# Patient Record
Sex: FEMALE | Race: WHITE | NOT HISPANIC OR LATINO | Employment: UNEMPLOYED | URBAN - METROPOLITAN AREA
[De-identification: names, ages, dates, MRNs, and addresses within clinical notes are randomized per-mention and may not be internally consistent; named-entity substitution may affect disease eponyms.]

---

## 2019-02-08 ENCOUNTER — HOSPITAL ENCOUNTER (EMERGENCY)
Facility: HOSPITAL | Age: 24
Discharge: HOME | End: 2019-02-08
Attending: EMERGENCY MEDICINE | Admitting: EMERGENCY MEDICINE

## 2019-02-08 ENCOUNTER — APPOINTMENT (OUTPATIENT)
Dept: ULTRASOUND IMAGING | Facility: HOSPITAL | Age: 24
End: 2019-02-08

## 2019-02-08 ENCOUNTER — HOSPITAL ENCOUNTER (OUTPATIENT)
Facility: HOSPITAL | Age: 24
Setting detail: OBSERVATION
Discharge: ANOTHER HOSPITAL | End: 2019-02-08
Attending: OBSTETRICS & GYNECOLOGY | Admitting: OBSTETRICS & GYNECOLOGY

## 2019-02-08 VITALS
WEIGHT: 190 LBS | TEMPERATURE: 98.4 F | BODY MASS INDEX: 33.66 KG/M2 | RESPIRATION RATE: 18 BRPM | OXYGEN SATURATION: 98 % | SYSTOLIC BLOOD PRESSURE: 136 MMHG | HEIGHT: 63 IN | DIASTOLIC BLOOD PRESSURE: 71 MMHG | HEART RATE: 79 BPM

## 2019-02-08 VITALS
TEMPERATURE: 98 F | SYSTOLIC BLOOD PRESSURE: 132 MMHG | HEIGHT: 63 IN | HEART RATE: 80 BPM | BODY MASS INDEX: 33.66 KG/M2 | WEIGHT: 190 LBS | RESPIRATION RATE: 17 BRPM | DIASTOLIC BLOOD PRESSURE: 82 MMHG

## 2019-02-08 DIAGNOSIS — O46.90 VAGINAL BLEEDING DURING PREGNANCY: Primary | ICD-10-CM

## 2019-02-08 PROBLEM — O20.0 THREATENED ABORTION: Status: ACTIVE | Noted: 2019-02-08

## 2019-02-08 PROBLEM — Z34.90 PREGNANCY: Status: ACTIVE | Noted: 2019-02-08

## 2019-02-08 PROBLEM — O20.9 VAGINAL BLEEDING BEFORE 22 WEEKS GESTATION: Status: ACTIVE | Noted: 2019-02-08

## 2019-02-08 PROBLEM — F17.200 SMOKER: Status: ACTIVE | Noted: 2019-02-08

## 2019-02-08 PROBLEM — F11.90 NARCOTIC DRUG USE: Status: ACTIVE | Noted: 2019-02-08

## 2019-02-08 LAB
ABO GROUP BLD: NORMAL
AMPHET+METHAMPHET UR QL: NEGATIVE
ANION GAP SERPL CALCULATED.3IONS-SCNC: 10.5 MMOL/L
BACTERIA UR QL AUTO: ABNORMAL /HPF
BARBITURATES UR QL SCN: NEGATIVE
BASOPHILS # BLD AUTO: 0.03 10*3/MM3 (ref 0–0.2)
BASOPHILS NFR BLD AUTO: 0.2 % (ref 0–1.5)
BENZODIAZ UR QL SCN: NEGATIVE
BILIRUB UR QL STRIP: NEGATIVE
BLD GP AB SCN SERPL QL: NEGATIVE
BUN BLD-MCNC: 6 MG/DL (ref 6–20)
BUN/CREAT SERPL: 11.1 (ref 7–25)
CALCIUM SPEC-SCNC: 9.2 MG/DL (ref 8.6–10.5)
CANNABINOIDS SERPL QL: NEGATIVE
CHLORIDE SERPL-SCNC: 108 MMOL/L (ref 98–107)
CLARITY UR: CLEAR
CLUE CELLS SPEC QL WET PREP: ABNORMAL
CO2 SERPL-SCNC: 23.5 MMOL/L (ref 22–29)
COCAINE UR QL: NEGATIVE
COLOR UR: YELLOW
CREAT BLD-MCNC: 0.54 MG/DL (ref 0.57–1)
DEPRECATED RDW RBC AUTO: 46.4 FL (ref 37–54)
EOSINOPHIL # BLD AUTO: 0.31 10*3/MM3 (ref 0–0.7)
EOSINOPHIL NFR BLD AUTO: 2.5 % (ref 0.3–6.2)
ERYTHROCYTE [DISTWIDTH] IN BLOOD BY AUTOMATED COUNT: 13.1 % (ref 11.7–13)
GFR SERPL CREATININE-BSD FRML MDRD: 141 ML/MIN/1.73
GLUCOSE BLD-MCNC: 80 MG/DL (ref 65–99)
GLUCOSE UR STRIP-MCNC: NEGATIVE MG/DL
HCG INTACT+B SERPL-ACNC: 3771 MIU/ML
HCT VFR BLD AUTO: 37.9 % (ref 35.6–45.5)
HGB BLD-MCNC: 12.7 G/DL (ref 11.9–15.5)
HGB UR QL STRIP.AUTO: ABNORMAL
HOLD SPECIMEN: NORMAL
HOLD SPECIMEN: NORMAL
HYALINE CASTS UR QL AUTO: ABNORMAL /LPF
HYDATID CYST SPEC WET PREP: ABNORMAL
IMM GRANULOCYTES # BLD AUTO: 0.05 10*3/MM3 (ref 0–0.03)
IMM GRANULOCYTES NFR BLD AUTO: 0.4 % (ref 0–0.5)
KETONES UR QL STRIP: ABNORMAL
LEUKOCYTE ESTERASE UR QL STRIP.AUTO: NEGATIVE
LYMPHOCYTES # BLD AUTO: 2.41 10*3/MM3 (ref 0.9–4.8)
LYMPHOCYTES NFR BLD AUTO: 19.5 % (ref 19.6–45.3)
MCH RBC QN AUTO: 32.7 PG (ref 26.9–32)
MCHC RBC AUTO-ENTMCNC: 33.5 G/DL (ref 32.4–36.3)
MCV RBC AUTO: 97.7 FL (ref 80.5–98.2)
METHADONE UR QL SCN: NEGATIVE
MONOCYTES # BLD AUTO: 1.12 10*3/MM3 (ref 0.2–1.2)
MONOCYTES NFR BLD AUTO: 9.1 % (ref 5–12)
NEUTROPHILS # BLD AUTO: 8.44 10*3/MM3 (ref 1.9–8.1)
NEUTROPHILS NFR BLD AUTO: 68.3 % (ref 42.7–76)
NITRITE UR QL STRIP: NEGATIVE
NRBC BLD AUTO-RTO: 0 /100 WBC (ref 0–0)
OPIATES UR QL: POSITIVE
OXYCODONE UR QL SCN: NEGATIVE
PH UR STRIP.AUTO: 7 [PH] (ref 5–8)
PLATELET # BLD AUTO: 229 10*3/MM3 (ref 140–500)
PMV BLD AUTO: 9.5 FL (ref 6–12)
POTASSIUM BLD-SCNC: 3.8 MMOL/L (ref 3.5–5.2)
PROT UR QL STRIP: NEGATIVE
RBC # BLD AUTO: 3.88 10*6/MM3 (ref 3.9–5.2)
RBC # UR: ABNORMAL /HPF
REF LAB TEST METHOD: ABNORMAL
RH BLD: POSITIVE
SODIUM BLD-SCNC: 142 MMOL/L (ref 136–145)
SP GR UR STRIP: 1.01 (ref 1–1.03)
SQUAMOUS #/AREA URNS HPF: ABNORMAL /HPF
T VAGINALIS SPEC QL WET PREP: ABNORMAL
T&S EXPIRATION DATE: NORMAL
UROBILINOGEN UR QL STRIP: ABNORMAL
WBC NRBC COR # BLD: 12.36 10*3/MM3 (ref 4.5–10.7)
WBC SPEC QL WET PREP: ABNORMAL
WBC UR QL AUTO: ABNORMAL /HPF
WHOLE BLOOD HOLD SPECIMEN: NORMAL
WHOLE BLOOD HOLD SPECIMEN: NORMAL
YEAST GENITAL QL WET PREP: ABNORMAL

## 2019-02-08 PROCEDURE — 25010000002 MORPHINE PER 10 MG: Performed by: EMERGENCY MEDICINE

## 2019-02-08 PROCEDURE — 80048 BASIC METABOLIC PNL TOTAL CA: CPT | Performed by: EMERGENCY MEDICINE

## 2019-02-08 PROCEDURE — 87491 CHLMYD TRACH DNA AMP PROBE: CPT | Performed by: OBSTETRICS & GYNECOLOGY

## 2019-02-08 PROCEDURE — 84702 CHORIONIC GONADOTROPIN TEST: CPT | Performed by: EMERGENCY MEDICINE

## 2019-02-08 PROCEDURE — 80307 DRUG TEST PRSMV CHEM ANLYZR: CPT | Performed by: OBSTETRICS & GYNECOLOGY

## 2019-02-08 PROCEDURE — 96361 HYDRATE IV INFUSION ADD-ON: CPT

## 2019-02-08 PROCEDURE — 76817 TRANSVAGINAL US OBSTETRIC: CPT

## 2019-02-08 PROCEDURE — 76805 OB US >/= 14 WKS SNGL FETUS: CPT

## 2019-02-08 PROCEDURE — 87109 MYCOPLASMA: CPT | Performed by: OBSTETRICS & GYNECOLOGY

## 2019-02-08 PROCEDURE — 87210 SMEAR WET MOUNT SALINE/INK: CPT | Performed by: OBSTETRICS & GYNECOLOGY

## 2019-02-08 PROCEDURE — 81001 URINALYSIS AUTO W/SCOPE: CPT | Performed by: OBSTETRICS & GYNECOLOGY

## 2019-02-08 PROCEDURE — 99283 EMERGENCY DEPT VISIT LOW MDM: CPT

## 2019-02-08 PROCEDURE — 85025 COMPLETE CBC W/AUTO DIFF WBC: CPT | Performed by: EMERGENCY MEDICINE

## 2019-02-08 PROCEDURE — 86900 BLOOD TYPING SEROLOGIC ABO: CPT | Performed by: EMERGENCY MEDICINE

## 2019-02-08 PROCEDURE — 96374 THER/PROPH/DIAG INJ IV PUSH: CPT

## 2019-02-08 PROCEDURE — 25010000002 HYDROMORPHONE 1 MG/ML SOLUTION: Performed by: OBSTETRICS & GYNECOLOGY

## 2019-02-08 PROCEDURE — G0378 HOSPITAL OBSERVATION PER HR: HCPCS

## 2019-02-08 PROCEDURE — 99214 OFFICE O/P EST MOD 30 MIN: CPT | Performed by: OBSTETRICS & GYNECOLOGY

## 2019-02-08 PROCEDURE — 86901 BLOOD TYPING SEROLOGIC RH(D): CPT | Performed by: EMERGENCY MEDICINE

## 2019-02-08 PROCEDURE — 86850 RBC ANTIBODY SCREEN: CPT | Performed by: EMERGENCY MEDICINE

## 2019-02-08 PROCEDURE — 87591 N.GONORRHOEAE DNA AMP PROB: CPT | Performed by: OBSTETRICS & GYNECOLOGY

## 2019-02-08 RX ORDER — GABAPENTIN 300 MG/1
300 CAPSULE ORAL NIGHTLY
COMMUNITY
End: 2020-05-15

## 2019-02-08 RX ORDER — MORPHINE SULFATE 2 MG/ML
4 INJECTION, SOLUTION INTRAMUSCULAR; INTRAVENOUS ONCE
Status: COMPLETED | OUTPATIENT
Start: 2019-02-08 | End: 2019-02-08

## 2019-02-08 RX ORDER — SODIUM CHLORIDE, SODIUM LACTATE, POTASSIUM CHLORIDE, CALCIUM CHLORIDE 600; 310; 30; 20 MG/100ML; MG/100ML; MG/100ML; MG/100ML
125 INJECTION, SOLUTION INTRAVENOUS CONTINUOUS
Status: DISCONTINUED | OUTPATIENT
Start: 2019-02-08 | End: 2019-02-09 | Stop reason: HOSPADM

## 2019-02-08 RX ORDER — SODIUM CHLORIDE 0.9 % (FLUSH) 0.9 %
10 SYRINGE (ML) INJECTION AS NEEDED
Status: DISCONTINUED | OUTPATIENT
Start: 2019-02-08 | End: 2019-02-08 | Stop reason: HOSPADM

## 2019-02-08 RX ORDER — HYDROCODONE BITARTRATE AND ACETAMINOPHEN 5; 325 MG/1; MG/1
1 TABLET ORAL EVERY 6 HOURS PRN
COMMUNITY
End: 2020-05-15

## 2019-02-08 RX ADMIN — HYDROMORPHONE HYDROCHLORIDE 1 MG: 1 INJECTION, SOLUTION INTRAMUSCULAR; INTRAVENOUS; SUBCUTANEOUS at 20:25

## 2019-02-08 RX ADMIN — MORPHINE SULFATE 4 MG: 2 INJECTION, SOLUTION INTRAMUSCULAR; INTRAVENOUS at 16:11

## 2019-02-08 RX ADMIN — SODIUM CHLORIDE, POTASSIUM CHLORIDE, SODIUM LACTATE AND CALCIUM CHLORIDE 125 ML/HR: 600; 310; 30; 20 INJECTION, SOLUTION INTRAVENOUS at 20:25

## 2019-02-08 NOTE — ED PROVIDER NOTES
" EMERGENCY DEPARTMENT ENCOUNTER    Room Number:  OFE/OFE  Date seen:  2019  Time seen: 2:58 PM  PCP: Lisa Negro,    OB: Dr. Mark Hamilton  Historian: patient    HPI:  Chief Complaint: vaginal bleeding  A complete HPI/ROS/PMH/PSH/SH/FH are unobtainable due to: nothing  Context: Mignon Vasques is a 23 y.o. female  () who presents to the ED c/o vaginal bleeding (\"bright red\" blood) that started one hour ago. The patient denies passing any blood clots. The patient also complains of suprapubic and LLQ \"cramping\" which worsens with palpation. The patient reports that her LNMP was in 10/2018. The patient reports that she had a positive home pregnancy test last week. Patient denies currently taking prenatal vitamins. Patient also denies seeing her OB (Dr. Mark Hamilton) for this pregnancy. The patient reports that she has a nine month old daughter and is not currently breast feeding. Patient has a hx of chronic back pain for which she takes Hydrocodone. Patient denies hx of anemia. Patient reports that she has had uncomplicated vaginal deliveries with her three prior pregnancies. Patient admits to smoking but denies ETOH consumption. There are no other complaints at this time.    Location: vagina  Radiation: none specified  Quality: vaginal bleeding (\"bright red\" blood)  Intensity/Severity: moderate  Duration: one hour  Onset quality: gradual  Timing: constant  Progression: none specified  Aggravating Factors: none specified  Alleviating Factors: none specified  Previous Episodes: none specified  Treatment before arrival: none specified  Associated Symptoms: suprapubic and LLQ \"cramping\" which worsens with palpation    PAST MEDICAL HISTORY  Active Ambulatory Problems     Diagnosis Date Noted   • Sore throat 2016   • Threatened  2019   • Back pain    • Narcotic drug use 2019   • Smoker 2019   • Pregnancy 2019   • Vaginal bleeding before 22 weeks gestation 2019 " "    Resolved Ambulatory Problems     Diagnosis Date Noted   • No Resolved Ambulatory Problems     Past Medical History:   Diagnosis Date   • Asthma    • Back pain    • Back pain    • Migraine          PAST SURGICAL HISTORY  Past Surgical History:   Procedure Laterality Date   • CHOLECYSTECTOMY     • DENTAL PROCEDURE           FAMILY HISTORY  Family History   Problem Relation Age of Onset   • Asthma Mother    • Diabetes Mother    • Hypertension Mother          SOCIAL HISTORY  Social History     Socioeconomic History   • Marital status:      Spouse name: Not on file   • Number of children: Not on file   • Years of education: Not on file   • Highest education level: Not on file   Social Needs   • Financial resource strain: Not on file   • Food insecurity - worry: Not on file   • Food insecurity - inability: Not on file   • Transportation needs - medical: Not on file   • Transportation needs - non-medical: Not on file   Occupational History   • Occupation: IT    Tobacco Use   • Smoking status: Current Every Day Smoker     Packs/day: 0.50     Types: Cigarettes   • Smokeless tobacco: Never Used   Substance and Sexual Activity   • Alcohol use: No   • Drug use: Yes     Types: Marijuana     Comment: vicodan for back pain    • Sexual activity: Yes     Partners: Male   Other Topics Concern   • Not on file   Social History Narrative   • Not on file         ALLERGIES  Amoxicillin and Adhesive tape        REVIEW OF SYSTEMS  Review of Systems   Constitutional: Negative for diaphoresis and fever.   HENT: Negative for congestion.    Eyes: Negative for visual disturbance.   Respiratory: Negative for shortness of breath.    Cardiovascular: Negative for palpitations.   Gastrointestinal: Positive for abdominal pain (suprapubic and LLQ \"cramping\" which worsens with palpation). Negative for blood in stool and vomiting.   Endocrine: Negative for polyuria.   Genitourinary: Positive for vaginal bleeding (\"bright red\"). Negative for " flank pain.   Musculoskeletal: Negative for joint swelling.   Skin: Negative for wound.   Neurological: Negative for seizures.   Hematological: Negative for adenopathy.   Psychiatric/Behavioral: Negative for sleep disturbance.            PHYSICAL EXAM  ED Triage Vitals [02/08/19 1430]   Temp Heart Rate Resp BP SpO2   98.4 °F (36.9 °C) 113 18 146/79 95 %      Temp src Heart Rate Source Patient Position BP Location FiO2 (%)   Tympanic Monitor -- -- --         GENERAL: no acute distress  HENT: nares patent  EYES: no scleral icterus  CV: regular rhythm, normal rate  RESPIRATORY: normal effort, CTAB  ABDOMEN: soft, mild suprapubic and LLQ tenderness without rebound or guarding  : performed pelvic exam with female chaperone, there is bright red blood on exam, cervical os is closed  MUSCULOSKELETAL: no deformity  NEURO: alert, moves all extremities, follows commands  SKIN: warm, dry    Vital signs and nursing notes reviewed.          LAB RESULTS  Recent Results (from the past 24 hour(s))   Light Blue Top    Collection Time: 02/08/19  2:52 PM   Result Value Ref Range    Extra Tube hold for add-on    Green Top (Gel)    Collection Time: 02/08/19  2:52 PM   Result Value Ref Range    Extra Tube Hold for add-ons.    Lavender Top    Collection Time: 02/08/19  2:52 PM   Result Value Ref Range    Extra Tube hold for add-on    Gold Top - SST    Collection Time: 02/08/19  2:52 PM   Result Value Ref Range    Extra Tube Hold for add-ons.    Basic Metabolic Panel    Collection Time: 02/08/19  2:52 PM   Result Value Ref Range    Glucose 80 65 - 99 mg/dL    BUN 6 6 - 20 mg/dL    Creatinine 0.54 (L) 0.57 - 1.00 mg/dL    Sodium 142 136 - 145 mmol/L    Potassium 3.8 3.5 - 5.2 mmol/L    Chloride 108 (H) 98 - 107 mmol/L    CO2 23.5 22.0 - 29.0 mmol/L    Calcium 9.2 8.6 - 10.5 mg/dL    eGFR Non African Amer 141 >60 mL/min/1.73    BUN/Creatinine Ratio 11.1 7.0 - 25.0    Anion Gap 10.5 mmol/L   hCG, Quantitative, Pregnancy    Collection Time:  02/08/19  2:52 PM   Result Value Ref Range    HCG Quantitative 3,771.00 mIU/mL   CBC Auto Differential    Collection Time: 02/08/19  2:52 PM   Result Value Ref Range    WBC 12.36 (H) 4.50 - 10.70 10*3/mm3    RBC 3.88 (L) 3.90 - 5.20 10*6/mm3    Hemoglobin 12.7 11.9 - 15.5 g/dL    Hematocrit 37.9 35.6 - 45.5 %    MCV 97.7 80.5 - 98.2 fL    MCH 32.7 (H) 26.9 - 32.0 pg    MCHC 33.5 32.4 - 36.3 g/dL    RDW 13.1 (H) 11.7 - 13.0 %    RDW-SD 46.4 37.0 - 54.0 fl    MPV 9.5 6.0 - 12.0 fL    Platelets 229 140 - 500 10*3/mm3    Neutrophil % 68.3 42.7 - 76.0 %    Lymphocyte % 19.5 (L) 19.6 - 45.3 %    Monocyte % 9.1 5.0 - 12.0 %    Eosinophil % 2.5 0.3 - 6.2 %    Basophil % 0.2 0.0 - 1.5 %    Immature Grans % 0.4 0.0 - 0.5 %    Neutrophils, Absolute 8.44 (H) 1.90 - 8.10 10*3/mm3    Lymphocytes, Absolute 2.41 0.90 - 4.80 10*3/mm3    Monocytes, Absolute 1.12 0.20 - 1.20 10*3/mm3    Eosinophils, Absolute 0.31 0.00 - 0.70 10*3/mm3    Basophils, Absolute 0.03 0.00 - 0.20 10*3/mm3    Immature Grans, Absolute 0.05 (H) 0.00 - 0.03 10*3/mm3    nRBC 0.0 0.0 - 0.0 /100 WBC   Type & Screen    Collection Time: 02/08/19  3:19 PM   Result Value Ref Range    ABO Type A     RH type Positive     Antibody Screen Negative     T&S Expiration Date 2/11/2019 11:59:59 PM    Wet Prep, Genital - Swab, Vagina    Collection Time: 02/08/19  6:35 PM   Result Value Ref Range    YEAST No yeast seen No yeast seen    HYPHAL ELEMENTS No Hyphal elements seen No Hyphal elements seen    WBC'S 1+ WBC's seen (A) No WBC's seen    Clue Cells, Wet Prep No Clue cells seen No Clue cells seen    Trichomonas, Wet Prep No Trichomonas seen No Trichomonas seen   Urinalysis With Culture If Indicated - Urine, Clean Catch    Collection Time: 02/08/19  6:40 PM   Result Value Ref Range    Color, UA Yellow Yellow, Straw    Appearance, UA Clear Clear    pH, UA 7.0 5.0 - 8.0    Specific Gravity, UA 1.015 1.005 - 1.030    Glucose, UA Negative Negative    Ketones, UA 40 mg/dL (2+) (A)  Negative    Bilirubin, UA Negative Negative    Blood, UA Large (3+) (A) Negative    Protein, UA Negative Negative    Leuk Esterase, UA Negative Negative    Nitrite, UA Negative Negative    Urobilinogen, UA 0.2 E.U./dL 0.2 - 1.0 E.U./dL   Urine Drug Screen - Urine, Clean Catch    Collection Time: 02/08/19  6:40 PM   Result Value Ref Range    Amphet/Methamphet, Screen Negative Negative    Barbiturates Screen, Urine Negative Negative    Benzodiazepine Screen, Urine Negative Negative    Cocaine Screen, Urine Negative Negative    Opiate Screen Positive (A) Negative    THC, Screen, Urine Negative Negative    Methadone Screen, Urine Negative Negative    Oxycodone Screen, Urine Negative Negative   Urinalysis, Microscopic Only - Urine, Clean Catch    Collection Time: 02/08/19  6:40 PM   Result Value Ref Range    RBC, UA Too Numerous to Count (A) None Seen, 0-2 /HPF    WBC, UA 0-2 None Seen, 0-2 /HPF    Bacteria, UA None Seen None Seen /HPF    Squamous Epithelial Cells, UA 0-2 None Seen, 0-2 /HPF    Hyaline Casts, UA None Seen None Seen /LPF    Methodology Automated Microscopy        Ordered the above labs and reviewed the results.        RADIOLOGY  No Radiology Exams Resulted Within Past 24 Hours    Ordered the above noted radiological studies. Reviewed by me in PACS.          PROCEDURES  Procedures          MEDICATIONS GIVEN IN ER  Medications   morphine injection 4 mg (4 mg Intravenous Given 2/8/19 1611)                   PROGRESS AND CONSULTS  1459 Initial encounter. Patient is stable. BP- 136/71 HR- 79 Temp- 98.4 °F (36.9 °C) (Tympanic) O2 sat- 98% on RA. Discussed the plan to check Transvaginal US and blood work for further evaluation. Also discussed the plan to treat patient's pain with Morphine. Pt understands and agrees with the plan, all questions answered.    1517 Ordered Transvaginal US and blood work for further evaluation.    1608 Ordered Morphine for abdominal pain.    1633 Received a call from Maryann US Tech,  who stated that the Transvaginal US shows a GA of 17 weeks. There were good fetal movements. Maryann reported that the study is limited since the patient is currently in her second trimester, and she is trained to perform an US with first trimester pregnancies.    1643 Rechecked the patient who is resting comfortably and in NAD. Patient is stable. BP- 146/79 HR- 113 Temp- 98.4 °F (36.9 °C) (Tympanic) O2 sat- 95% on RA. Informed the patient of her Transvaginal US that shows an estimated GA of 17 weeks with good fetal movements. Also informed the patient that her blood type is A+, her hCG is 3771, and her Hemoglobin is 12.7. Discussed the plan to be transferred to L&D since the estimated GA is greater than 16 weeks. Pt understands and agrees with the plan, all questions answered.      MEDICAL DECISION MAKING    US shows IUP with EGA of 17 weeks, thus US study not completed, patient transferred to L&D for further evaluation.    Type A+, rhogam not indicated.   H&H reassuring.      MDM  Number of Diagnoses or Management Options     Amount and/or Complexity of Data Reviewed  Clinical lab tests: ordered and reviewed (blood type is A+, her hCG is 3771, and her Hemoglobin is 12.7.)  Tests in the radiology section of CPT®: ordered and reviewed  Decide to obtain previous medical records or to obtain history from someone other than the patient: yes (Epic)  Review and summarize past medical records: yes (The patient has no pertinent recent ED visits in Psychiatric.)  Independent visualization of images, tracings, or specimens: yes    Patient Progress  Patient progress: stable             DIAGNOSIS  Final diagnoses:   Vaginal bleeding during pregnancy         DISPOSITION  SENT TO L&D            Latest Documented Vital Signs:  As of 9:57 PM  BP- 136/71 HR- 79 Temp- 98.4 °F (36.9 °C) (Tympanic) O2 sat- 98%        --  Documentation assistance provided by margie Aponte for Dr. RICKI Newell MD.  Information recorded by the margie  was done at my direction and has been verified and validated by me.     Feli Aponte  02/08/19 [id number]       Thong Newell MD  02/08/19 1750

## 2019-02-08 NOTE — ED NOTES
"Pt reports she is unsure how far along in pregnancy she is. States she had a \"positive home pregnancy test last week\".     eMlina Swenson RN  02/08/19 5531    "
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
## 2019-02-09 NOTE — NURSING NOTE
[facility] here to transfer patient to . Report given to EMS and patient assisted onto stretcher with LR still running, approximately 100ml/hr. Patient verbalizes understanding of need for transfer and understands the risks and benefits explained to her by Dr. Lauren. Elizabeth Adame RN

## 2019-02-11 LAB — BUPRENORPHINE UR QL: NEGATIVE NG/ML

## 2019-02-12 LAB
C TRACH RRNA SPEC DONR QL NAA+PROBE: NEGATIVE
N GONORRHOEA DNA SPEC QL NAA+PROBE: NEGATIVE

## 2019-02-14 LAB — OPIATE CONFIRMATION URINE: POSITIVE

## 2019-02-17 LAB
MYCOPLASMA HOMINIS: NEGATIVE
UREAPLASMA UREALYTICUM: NEGATIVE

## 2020-07-02 ENCOUNTER — TELEMEDICINE (OUTPATIENT)
Dept: FAMILY MEDICINE CLINIC | Facility: CLINIC | Age: 25
End: 2020-07-02

## 2020-07-02 DIAGNOSIS — J20.9 BRONCHITIS WITH ASTHMA, ACUTE: Primary | ICD-10-CM

## 2020-07-02 DIAGNOSIS — J45.909 BRONCHITIS WITH ASTHMA, ACUTE: Primary | ICD-10-CM

## 2020-07-02 PROBLEM — Z34.90 PREGNANCY: Status: RESOLVED | Noted: 2019-02-08 | Resolved: 2020-07-02

## 2020-07-02 PROCEDURE — 99213 OFFICE O/P EST LOW 20 MIN: CPT | Performed by: FAMILY MEDICINE

## 2020-07-02 RX ORDER — AZITHROMYCIN 250 MG/1
TABLET, FILM COATED ORAL
Qty: 6 TABLET | Refills: 0 | Status: SHIPPED | OUTPATIENT
Start: 2020-07-02 | End: 2020-07-16

## 2020-07-02 RX ORDER — ALBUTEROL SULFATE 90 UG/1
2 AEROSOL, METERED RESPIRATORY (INHALATION) EVERY 4 HOURS PRN
Qty: 1 INHALER | Refills: 0 | Status: SHIPPED | OUTPATIENT
Start: 2020-07-02

## 2020-07-02 NOTE — PROGRESS NOTES
Subjective   Mignon Vasques is a 24 y.o. female.     Chief Complaint   Patient presents with   • Asthma   • Cough       Patient has requested a video visit today during the coronavirus pandemic discuss some new symptoms she has having.  She started last week with a sore throat, that has since improved.  However now she is having a significant cough which is productive of yellow mucus over the last couple of days.  She denies any current shortness of breath but did state that she had a episode of shortness of breath 4 days ago.  She denies any fevers or chills.  She denies any known contact with anyone with COVID-19.  She has not been around anyone else who is sick.  She does not have an inhaler at home but does have a history of asthma.  She has noticed wheezing more at night.  She has used the Advair inhaler in the past and was very helpful.       Review of Systems   Constitutional: Negative for activity change, chills, fatigue and fever.   HENT: Positive for congestion and sore throat. Negative for hearing loss, rhinorrhea, swollen glands, tinnitus and trouble swallowing.    Eyes: Negative for pain and visual disturbance.   Respiratory: Positive for cough and wheezing. Negative for shortness of breath.    Cardiovascular: Negative for chest pain, palpitations and leg swelling.   Gastrointestinal: Negative for diarrhea and nausea.   Endocrine: Negative for polydipsia and polyuria.   Genitourinary: Negative for difficulty urinating and urinary incontinence.   Musculoskeletal: Negative for arthralgias, gait problem and joint swelling.   Skin: Negative for rash.   Allergic/Immunologic: Negative for immunocompromised state.   Neurological: Negative for dizziness, light-headedness and headache.   Hematological: Negative for adenopathy. Does not bruise/bleed easily.   Psychiatric/Behavioral: Negative for dysphoric mood and sleep disturbance.       The following portions of the patient's history were reviewed and updated as  appropriate: allergies, current medications, past family history, past medical history, past social history, past surgical history and problem list.    Past Medical History:   Diagnosis Date   • Asthma    • Back pain     Takes 1-2 Vicodin per day   • Back pain     Slipped disc in L3 and L4   • Migraine        Past Surgical History:   Procedure Laterality Date   • CHOLECYSTECTOMY     • DENTAL PROCEDURE         Family History   Problem Relation Age of Onset   • Asthma Mother    • Diabetes Mother    • Hypertension Mother        Social History     Socioeconomic History   • Marital status:      Spouse name: Not on file   • Number of children: Not on file   • Years of education: Not on file   • Highest education level: Not on file   Occupational History   • Occupation: IT    Tobacco Use   • Smoking status: Current Every Day Smoker     Packs/day: 0.50     Types: Cigarettes   • Smokeless tobacco: Never Used   Substance and Sexual Activity   • Alcohol use: No   • Drug use: Yes     Types: Marijuana     Comment: vicodan for back pain    • Sexual activity: Yes     Partners: Male     Birth control/protection: Implant         Current Outpatient Medications:   •  albuterol sulfate  (90 Base) MCG/ACT inhaler, Inhale 2 puffs Every 4 (Four) Hours As Needed for Wheezing., Disp: 1 inhaler, Rfl: 0  •  azithromycin (Zithromax) 250 MG tablet, 2 p.o. on the first day and then 1 p.o. daily, Disp: 6 tablet, Rfl: 0  •  fluticasone-salmeterol (Advair Diskus) 100-50 MCG/DOSE DISKUS, Inhale 1 puff 2 (Two) Times a Day., Disp: 60 each, Rfl: 0    Objective     There were no vitals filed for this visit.    There is no height or weight on file to calculate BMI.    No components found for: 2D    Physical Exam   Constitutional: She is oriented to person, place, and time. She appears well-developed and well-nourished.   HENT:   Head: Normocephalic and atraumatic.   Eyes: Conjunctivae are normal.   Neck: Normal range of motion.    Pulmonary/Chest: Effort normal.   Musculoskeletal: Normal range of motion.   Neurological: She is alert and oriented to person, place, and time.   Skin: No rash noted.   Psychiatric: She has a normal mood and affect. Her behavior is normal. Judgment and thought content normal.       Procedures    Assessment/Plan   Mignon was seen today for asthma and cough.    Diagnoses and all orders for this visit:    Bronchitis with asthma, acute  -     azithromycin (Zithromax) 250 MG tablet; 2 p.o. on the first day and then 1 p.o. daily  -     albuterol sulfate  (90 Base) MCG/ACT inhaler; Inhale 2 puffs Every 4 (Four) Hours As Needed for Wheezing.  -     fluticasone-salmeterol (Advair Diskus) 100-50 MCG/DOSE DISKUS; Inhale 1 puff 2 (Two) Times a Day.    I explained that the patient should use the albuterol inhaler as a rescue inhaler and use the Advair discus for prevention.  If her symptoms are worsening she should contact me.  This was an audio and video enabled telemedicine encounter lasting 15 minutes all of which was spent in direct audio and video contact with the patient.  Patient Instructions   Acute Bronchitis, Adult  Acute bronchitis is when air tubes (bronchi) in the lungs suddenly get swollen. The condition can make it hard to breathe. It can also cause these symptoms:  · A cough.  · Coughing up clear, yellow, or green mucus.  · Wheezing.  · Chest congestion.  · Shortness of breath.  · A fever.  · Body aches.  · Chills.  · A sore throat.  Follow these instructions at home:    Medicines  · Take over-the-counter and prescription medicines only as told by your doctor.  · If you were prescribed an antibiotic medicine, take it as told by your doctor. Do not stop taking the antibiotic even if you start to feel better.  General instructions  · Rest.  · Drink enough fluids to keep your pee (urine) pale yellow.  · Avoid smoking and secondhand smoke. If you smoke and you need help quitting, ask your doctor. Quitting  will help your lungs heal faster.  · Use an inhaler, cool mist vaporizer, or humidifier as told by your doctor.  · Keep all follow-up visits as told by your doctor. This is important.  How is this prevented?  To lower your risk of getting this condition again:  · Wash your hands often with soap and water. If you cannot use soap and water, use hand .  · Avoid contact with people who have cold symptoms.  · Try not to touch your hands to your mouth, nose, or eyes.  · Make sure to get the flu shot every year.  Contact a doctor if:  · Your symptoms do not get better in 2 weeks.  Get help right away if:  · You cough up blood.  · You have chest pain.  · You have very bad shortness of breath.  · You become dehydrated.  · You faint (pass out) or keep feeling like you are going to pass out.  · You keep throwing up (vomiting).  · You have a very bad headache.  · Your fever or chills gets worse.  This information is not intended to replace advice given to you by your health care provider. Make sure you discuss any questions you have with your health care provider.  Document Released: 06/05/2009 Document Revised: 11/30/2018 Document Reviewed: 06/07/2017  [employer]vier Patient Education © 2020 [employer]

## 2020-07-16 ENCOUNTER — TELEMEDICINE (OUTPATIENT)
Dept: FAMILY MEDICINE CLINIC | Facility: CLINIC | Age: 25
End: 2020-07-16

## 2020-07-16 DIAGNOSIS — J06.9 ACUTE URI: Primary | ICD-10-CM

## 2020-07-16 PROCEDURE — 99213 OFFICE O/P EST LOW 20 MIN: CPT | Performed by: FAMILY MEDICINE

## 2020-07-16 NOTE — PATIENT INSTRUCTIONS
"Upper Respiratory Infection, Adult  An upper respiratory infection (URI) affects the nose, throat, and upper air passages. URIs are caused by germs (viruses). The most common type of URI is often called \"the common cold.\"  Medicines cannot cure URIs, but you can do things at home to relieve your symptoms. URIs usually get better within 7-10 days.  Follow these instructions at home:  Activity  · Rest as needed.  · If you have a fever, stay home from work or school until your fever is gone, or until your doctor says you may return to work or school.  ? You should stay home until you cannot spread the infection anymore (you are not contagious).  ? Your doctor may have you wear a face mask so you have less risk of spreading the infection.  Relieving symptoms  · Gargle with a salt-water mixture 3-4 times a day or as needed. To make a salt-water mixture, completely dissolve ½-1 tsp of salt in 1 cup of warm water.  · Use a cool-mist humidifier to add moisture to the air. This can help you breathe more easily.  Eating and drinking    · Drink enough fluid to keep your pee (urine) pale yellow.  · Eat soups and other clear broths.  General instructions    · Take over-the-counter and prescription medicines only as told by your doctor. These include cold medicines, fever reducers, and cough suppressants.  · Do not use any products that contain nicotine or tobacco. These include cigarettes and e-cigarettes. If you need help quitting, ask your doctor.  · Avoid being where people are smoking (avoid secondhand smoke).  · Make sure you get regular shots and get the flu shot every year.  · Keep all follow-up visits as told by your doctor. This is important.  How to avoid spreading infection to others    · Wash your hands often with soap and water. If you do not have soap and water, use hand .  · Avoid touching your mouth, face, eyes, or nose.  · Cough or sneeze into a tissue or your sleeve or elbow. Do not cough or sneeze " "into your hand or into the air.  Contact a doctor if:  · You are getting worse, not better.  · You have any of these:  ? A fever.  ? Chills.  ? Brown or red mucus in your nose.  ? Yellow or brown fluid (discharge)coming from your nose.  ? Pain in your face, especially when you bend forward.  ? Swollen neck glands.  ? Pain with swallowing.  ? White areas in the back of your throat.  Get help right away if:  · You have shortness of breath that gets worse.  · You have very bad or constant:  ? Headache.  ? Ear pain.  ? Pain in your forehead, behind your eyes, and over your cheekbones (sinus pain).  ? Chest pain.  · You have long-lasting (chronic) lung disease along with any of these:  ? Wheezing.  ? Long-lasting cough.  ? Coughing up blood.  ? A change in your usual mucus.  · You have a stiff neck.  · You have changes in your:  ? Vision.  ? Hearing.  ? Thinking.  ? Mood.  Summary  · An upper respiratory infection (URI) is caused by a germ called a virus. The most common type of URI is often called \"the common cold.\"  · URIs usually get better within 7-10 days.  · Take over-the-counter and prescription medicines only as told by your doctor.  This information is not intended to replace advice given to you by your health care provider. Make sure you discuss any questions you have with your health care provider.  Document Released: 06/05/2009 Document Revised: 12/26/2019 Document Reviewed: 08/10/2018  [employer] Patient Education © 2020 [employer]  COVID-19: How to Protect Yourself and Others  Know how it spreads  · There is currently no vaccine to prevent coronavirus disease 2019 (COVID-19).  · The best way to prevent illness is to avoid being exposed to this virus.  · The virus is thought to spread mainly from person-to-person.  ? Between people who are in close contact with one another (within about 6 feet).  ? Through respiratory droplets produced when an infected person coughs, sneezes or talks.  ? These droplets can " land in the mouths or noses of people who are nearby or possibly be inhaled into the lungs.  ? Some recent studies have suggested that COVID-19 may be spread by people who are not showing symptoms.  Everyone should  Clean your hands often  · Wash your hands often with soap and water for at least 20 seconds especially after you have been in a public place, or after blowing your nose, coughing, or sneezing.  · If soap and water are not readily available, use a hand  that contains at least 60% alcohol. Cover all surfaces of your hands and rub them together until they feel dry.  · Avoid touching your eyes, nose, and mouth with unwashed hands.  Avoid close contact  · Avoid close contact with people who are sick.  · Stay at home as much as possible.  · Put distance between yourself and other people.  ? Remember that some people without symptoms may be able to spread virus.  ? This is especially important for people who are at higher risk of getting very sick.https://www.cdc.gov/coronavirus/2019-ncov/need-extra-precautions/people-at-higher-risk.html  Cover your mouth and nose with a cloth face cover when around others  · You could spread COVID-19 to others even if you do not feel sick.  · Everyone should wear a cloth face cover when they have to go out in public, for example to the grocery store or to  other necessities.  ? Cloth face coverings should not be placed on young children under age 2, anyone who has trouble breathing, or is unconscious, incapacitated or otherwise unable to remove the mask without assistance.  · The cloth face cover is meant to protect other people in case you are infected.  · Do NOT use a facemask meant for a healthcare worker.  · Continue to keep about 6 feet between yourself and others. The cloth face cover is not a substitute for social distancing.  Cover coughs and sneezes  · If you are in a private setting and do not have on your cloth face covering, remember to always cover  your mouth and nose with a tissue when you cough or sneeze or use the inside of your elbow.  · Throw used tissues in the trash.  · Immediately wash your hands with soap and water for at least 20 seconds. If soap and water are not readily available, clean your hands with a hand  that contains at least 60% alcohol.  Clean and disinfect  · Clean AND disinfect frequently touched surfaces daily. This includes tables, doorknobs, light switches, countertops, handles, desks, phones, keyboards, toilets, faucets, and sinks. https://www.cdc.gov/coronavirus/2019-ncov/prevent-getting-sick/disinfecting-your-home.html  · If surfaces are dirty, clean them: Use detergent or soap and water prior to disinfection.  cdc.gov/coronavirus  04/11/2020  This information is not intended to replace advice given to you by your health care provider. Make sure you discuss any questions you have with your health care provider.  Document Released: 04/14/2020 Document Revised: 04/17/2020 Document Reviewed: 04/14/2020  [employer] Patient Education © 2020 [employer]

## 2020-07-16 NOTE — PROGRESS NOTES
Subjective   Mignon Vasques is a 24 y.o. female.     Chief Complaint   Patient presents with   • Sore Throat   • Headache   • Fever     You have chosen to receive care through a telehealth visit.  Do you consent to use a video/audio connection for your medical care today? YES    Patient requested a video visit today to discuss some new symptoms she was having.  Earlier this month she was treated with a Z-Dario for bronchitis and did seem to improve until yesterday when she started with sore throat, headache, congestion, and runny nose.  She states that a week ago her daughter had an ear infection but other than that she has not been around any ill contacts.  She denies any known COVID-19 contacts.  She currently is working from home.  The only people besides her family members that she is around are the neighbors.  The patient denies cough or shortness of breath.  She has not been taking her allergy medicine.       Review of Systems   Constitutional: Positive for fever (100.4). Negative for activity change, chills and fatigue.   HENT: Positive for congestion and sore throat. Negative for hearing loss, swollen glands, tinnitus and trouble swallowing.    Eyes: Negative for pain and visual disturbance.   Respiratory: Negative for cough and shortness of breath.    Cardiovascular: Negative for chest pain, palpitations and leg swelling.   Gastrointestinal: Negative for diarrhea and nausea.   Endocrine: Negative for polydipsia and polyuria.   Genitourinary: Negative for difficulty urinating and urinary incontinence.   Musculoskeletal: Negative for arthralgias, gait problem and joint swelling.   Skin: Negative for rash.   Allergic/Immunologic: Negative for immunocompromised state.   Neurological: Positive for headache. Negative for dizziness and light-headedness.   Hematological: Negative for adenopathy. Does not bruise/bleed easily.   Psychiatric/Behavioral: Negative for dysphoric mood and sleep disturbance.       The  following portions of the patient's history were reviewed and updated as appropriate: allergies, current medications, past family history, past medical history, past social history, past surgical history and problem list.    Past Medical History:   Diagnosis Date   • Asthma    • Back pain     Takes 1-2 Vicodin per day   • Back pain     Slipped disc in L3 and L4   • Migraine        Past Surgical History:   Procedure Laterality Date   • CHOLECYSTECTOMY     • DENTAL PROCEDURE         Family History   Problem Relation Age of Onset   • Asthma Mother    • Diabetes Mother    • Hypertension Mother        Social History     Socioeconomic History   • Marital status:      Spouse name: Not on file   • Number of children: Not on file   • Years of education: Not on file   • Highest education level: Not on file   Occupational History   • Occupation: IT    Tobacco Use   • Smoking status: Current Every Day Smoker     Packs/day: 0.50     Types: Cigarettes   • Smokeless tobacco: Never Used   Substance and Sexual Activity   • Alcohol use: No   • Drug use: Yes     Types: Marijuana     Comment: vicodan for back pain    • Sexual activity: Yes     Partners: Male     Birth control/protection: Implant         Current Outpatient Medications:   •  albuterol sulfate  (90 Base) MCG/ACT inhaler, Inhale 2 puffs Every 4 (Four) Hours As Needed for Wheezing., Disp: 1 inhaler, Rfl: 0  •  fluticasone-salmeterol (Advair Diskus) 100-50 MCG/DOSE DISKUS, Inhale 1 puff 2 (Two) Times a Day., Disp: 60 each, Rfl: 0    Objective     There were no vitals filed for this visit.    There is no height or weight on file to calculate BMI.    No components found for: 2D    Physical Exam   Constitutional: She is oriented to person, place, and time. She appears well-developed and well-nourished.   HENT:   Head: Normocephalic and atraumatic.   Eyes: Conjunctivae are normal. No scleral icterus.   Neck: Normal range of motion.   Pulmonary/Chest: Effort normal.  "  Neurological: She is alert and oriented to person, place, and time.   Psychiatric: She has a normal mood and affect. Her behavior is normal. Judgment and thought content normal.       Procedures    Assessment/Plan   Mignon was seen today for sore throat, headache and fever.    Diagnoses and all orders for this visit:    Acute URI    Patient was advised to contact the office if her symptoms worsen.  She prefers not to be tested for COVID-19 at this time.  Since she is able to self quarantine,I have advised her to do so until she is symptom free and 10 days has passed.  She was advised to use ibuprofen 600 mg every 6 hours as needed for headache fever.  She was also advised to drink plenty of water every day.  This was an audio and video enabled telemedicine encounter lasting 17 minutes, all of which was spent in direct audio and video contact with the patient.    Patient Instructions   Upper Respiratory Infection, Adult  An upper respiratory infection (URI) affects the nose, throat, and upper air passages. URIs are caused by germs (viruses). The most common type of URI is often called \"the common cold.\"  Medicines cannot cure URIs, but you can do things at home to relieve your symptoms. URIs usually get better within 7-10 days.  Follow these instructions at home:  Activity  · Rest as needed.  · If you have a fever, stay home from work or school until your fever is gone, or until your doctor says you may return to work or school.  ? You should stay home until you cannot spread the infection anymore (you are not contagious).  ? Your doctor may have you wear a face mask so you have less risk of spreading the infection.  Relieving symptoms  · Gargle with a salt-water mixture 3-4 times a day or as needed. To make a salt-water mixture, completely dissolve ½-1 tsp of salt in 1 cup of warm water.  · Use a cool-mist humidifier to add moisture to the air. This can help you breathe more easily.  Eating and drinking    · Drink " "enough fluid to keep your pee (urine) pale yellow.  · Eat soups and other clear broths.  General instructions    · Take over-the-counter and prescription medicines only as told by your doctor. These include cold medicines, fever reducers, and cough suppressants.  · Do not use any products that contain nicotine or tobacco. These include cigarettes and e-cigarettes. If you need help quitting, ask your doctor.  · Avoid being where people are smoking (avoid secondhand smoke).  · Make sure you get regular shots and get the flu shot every year.  · Keep all follow-up visits as told by your doctor. This is important.  How to avoid spreading infection to others    · Wash your hands often with soap and water. If you do not have soap and water, use hand .  · Avoid touching your mouth, face, eyes, or nose.  · Cough or sneeze into a tissue or your sleeve or elbow. Do not cough or sneeze into your hand or into the air.  Contact a doctor if:  · You are getting worse, not better.  · You have any of these:  ? A fever.  ? Chills.  ? Brown or red mucus in your nose.  ? Yellow or brown fluid (discharge)coming from your nose.  ? Pain in your face, especially when you bend forward.  ? Swollen neck glands.  ? Pain with swallowing.  ? White areas in the back of your throat.  Get help right away if:  · You have shortness of breath that gets worse.  · You have very bad or constant:  ? Headache.  ? Ear pain.  ? Pain in your forehead, behind your eyes, and over your cheekbones (sinus pain).  ? Chest pain.  · You have long-lasting (chronic) lung disease along with any of these:  ? Wheezing.  ? Long-lasting cough.  ? Coughing up blood.  ? A change in your usual mucus.  · You have a stiff neck.  · You have changes in your:  ? Vision.  ? Hearing.  ? Thinking.  ? Mood.  Summary  · An upper respiratory infection (URI) is caused by a germ called a virus. The most common type of URI is often called \"the common cold.\"  · URIs usually get " better within 7-10 days.  · Take over-the-counter and prescription medicines only as told by your doctor.  This information is not intended to replace advice given to you by your health care provider. Make sure you discuss any questions you have with your health care provider.  Document Released: 06/05/2009 Document Revised: 12/26/2019 Document Reviewed: 08/10/2018  [employer] Patient Education © 2020 [employer]  COVID-19: How to Protect Yourself and Others  Know how it spreads  · There is currently no vaccine to prevent coronavirus disease 2019 (COVID-19).  · The best way to prevent illness is to avoid being exposed to this virus.  · The virus is thought to spread mainly from person-to-person.  ? Between people who are in close contact with one another (within about 6 feet).  ? Through respiratory droplets produced when an infected person coughs, sneezes or talks.  ? These droplets can land in the mouths or noses of people who are nearby or possibly be inhaled into the lungs.  ? Some recent studies have suggested that COVID-19 may be spread by people who are not showing symptoms.  Everyone should  Clean your hands often  · Wash your hands often with soap and water for at least 20 seconds especially after you have been in a public place, or after blowing your nose, coughing, or sneezing.  · If soap and water are not readily available, use a hand  that contains at least 60% alcohol. Cover all surfaces of your hands and rub them together until they feel dry.  · Avoid touching your eyes, nose, and mouth with unwashed hands.  Avoid close contact  · Avoid close contact with people who are sick.  · Stay at home as much as possible.  · Put distance between yourself and other people.  ? Remember that some people without symptoms may be able to spread virus.  ? This is especially important for people who are at higher risk of getting very  sick.https://www.cdc.gov/coronavirus/2019-ncov/need-extra-precautions/people-at-higher-risk.html  Cover your mouth and nose with a cloth face cover when around others  · You could spread COVID-19 to others even if you do not feel sick.  · Everyone should wear a cloth face cover when they have to go out in public, for example to the grocery store or to  other necessities.  ? Cloth face coverings should not be placed on young children under age 2, anyone who has trouble breathing, or is unconscious, incapacitated or otherwise unable to remove the mask without assistance.  · The cloth face cover is meant to protect other people in case you are infected.  · Do NOT use a facemask meant for a healthcare worker.  · Continue to keep about 6 feet between yourself and others. The cloth face cover is not a substitute for social distancing.  Cover coughs and sneezes  · If you are in a private setting and do not have on your cloth face covering, remember to always cover your mouth and nose with a tissue when you cough or sneeze or use the inside of your elbow.  · Throw used tissues in the trash.  · Immediately wash your hands with soap and water for at least 20 seconds. If soap and water are not readily available, clean your hands with a hand  that contains at least 60% alcohol.  Clean and disinfect  · Clean AND disinfect frequently touched surfaces daily. This includes tables, doorknobs, light switches, countertops, handles, desks, phones, keyboards, toilets, faucets, and sinks. https://www.cdc.gov/coronavirus/2019-ncov/prevent-getting-sick/disinfecting-your-home.html  · If surfaces are dirty, clean them: Use detergent or soap and water prior to disinfection.  cdc.gov/coronavirus  04/11/2020  This information is not intended to replace advice given to you by your health care provider. Make sure you discuss any questions you have with your health care provider.  Document Released: 04/14/2020 Document Revised:  04/17/2020 Document Reviewed: 04/14/2020  [employer] Patient Education © 2020 [employer]

## 2020-10-01 ENCOUNTER — OFFICE VISIT (OUTPATIENT)
Dept: FAMILY MEDICINE CLINIC | Facility: CLINIC | Age: 25
End: 2020-10-01

## 2020-10-01 VITALS
WEIGHT: 189.4 LBS | DIASTOLIC BLOOD PRESSURE: 84 MMHG | HEART RATE: 88 BPM | TEMPERATURE: 97.3 F | SYSTOLIC BLOOD PRESSURE: 128 MMHG | HEIGHT: 63 IN | BODY MASS INDEX: 33.56 KG/M2 | OXYGEN SATURATION: 99 %

## 2020-10-01 DIAGNOSIS — M54.2 POSTERIOR NECK PAIN: Primary | ICD-10-CM

## 2020-10-01 PROCEDURE — 99214 OFFICE O/P EST MOD 30 MIN: CPT | Performed by: FAMILY MEDICINE

## 2020-10-01 RX ORDER — DOXYCYCLINE HYCLATE 100 MG
TABLET ORAL
COMMUNITY
Start: 2020-07-28

## 2020-10-01 RX ORDER — TIZANIDINE 4 MG/1
4 TABLET ORAL EVERY 8 HOURS PRN
Qty: 30 TABLET | Refills: 0 | Status: SHIPPED | OUTPATIENT
Start: 2020-10-01

## 2020-10-01 RX ORDER — DICLOFENAC SODIUM 75 MG/1
75 TABLET, DELAYED RELEASE ORAL 2 TIMES DAILY PRN
Qty: 60 TABLET | Refills: 0 | Status: SHIPPED | OUTPATIENT
Start: 2020-10-01

## 2020-10-01 NOTE — PROGRESS NOTES
Subjective   Mignon Vasques is a 25 y.o. female.     Chief Complaint   Patient presents with   • Neck Pain       Patient is here today with a new problem.  She started yesterday with some posterior neck pain which got worse throughout the day.  She woke up today with even more pain in the posterior neck.  She also has a headache throughout the back of her head and coming forward.  She denies any upper respiratory symptoms, fever or chills.  Movements make it worse, rest make it makes it better.She has a h/o chronic pain in lumbar spine and used to see pain management for that.  Patient denies recent trauma or injury.        Review of Systems   Constitutional: Negative for activity change, chills, fatigue and fever.   HENT: Negative for hearing loss, swollen glands, tinnitus and trouble swallowing.    Eyes: Negative for pain and visual disturbance.   Respiratory: Negative for cough and shortness of breath.    Cardiovascular: Negative for chest pain, palpitations and leg swelling.   Gastrointestinal: Negative for diarrhea and nausea.   Endocrine: Negative for polydipsia and polyuria.   Genitourinary: Negative for difficulty urinating and urinary incontinence.   Musculoskeletal: Positive for arthralgias and myalgias. Negative for gait problem and joint swelling.   Skin: Negative for rash.   Allergic/Immunologic: Negative for immunocompromised state.   Neurological: Negative for dizziness, light-headedness and headache.   Hematological: Negative for adenopathy. Does not bruise/bleed easily.   Psychiatric/Behavioral: Negative for dysphoric mood and sleep disturbance.       The following portions of the patient's history were reviewed and updated as appropriate: allergies, current medications, past family history, past medical history, past social history, past surgical history and problem list.    Past Medical History:   Diagnosis Date   • Asthma    • Back pain     Takes 1-2 Vicodin per day   • Back pain     Slipped  "disc in L3 and L4   • Migraine        Past Surgical History:   Procedure Laterality Date   • CHOLECYSTECTOMY     • DENTAL PROCEDURE         Family History   Problem Relation Age of Onset   • Asthma Mother    • Diabetes Mother    • Hypertension Mother        Social History     Socioeconomic History   • Marital status:      Spouse name: Not on file   • Number of children: Not on file   • Years of education: Not on file   • Highest education level: Not on file   Occupational History   • Occupation: IT    Tobacco Use   • Smoking status: Current Every Day Smoker     Packs/day: 0.50     Years: 5.00     Pack years: 2.50     Types: Cigarettes   • Smokeless tobacco: Never Used   Substance and Sexual Activity   • Alcohol use: No   • Drug use: Yes     Types: Marijuana     Comment: vicodan for back pain    • Sexual activity: Yes     Partners: Male     Birth control/protection: Implant         Current Outpatient Medications:   •  albuterol sulfate  (90 Base) MCG/ACT inhaler, Inhale 2 puffs Every 4 (Four) Hours As Needed for Wheezing., Disp: 1 inhaler, Rfl: 0  •  doxycycline (VIBRAMYICN) 100 MG tablet, TAKE ONE TABLET BY MOUTH TWICE DAILY WITH FOOD AND WATER FOR TWO MONTHS, Disp: , Rfl:   •  diclofenac (VOLTAREN) 75 MG EC tablet, Take 1 tablet by mouth 2 (Two) Times a Day As Needed (neck pain). Take with food, Disp: 60 tablet, Rfl: 0  •  tiZANidine (ZANAFLEX) 4 MG tablet, Take 1 tablet by mouth Every 8 (Eight) Hours As Needed for Muscle Spasms., Disp: 30 tablet, Rfl: 0    Objective     Vitals:    10/01/20 1329   BP: 128/84   Pulse: 88   Temp: 97.3 °F (36.3 °C)   SpO2: 99%   Weight: 85.9 kg (189 lb 6.4 oz)   Height: 160 cm (63\")       Body mass index is 33.55 kg/m².    No components found for: 2D    Physical Exam  Vitals signs and nursing note reviewed.   Constitutional:       Appearance: She is well-developed.   HENT:      Head: Normocephalic and atraumatic.      Right Ear: External ear normal.      Left Ear: " External ear normal.      Nose: Nose normal.   Eyes:      General: No scleral icterus.     Conjunctiva/sclera: Conjunctivae normal.   Neck:      Musculoskeletal: Neck supple. Muscular tenderness present.   Cardiovascular:      Rate and Rhythm: Normal rate and regular rhythm.      Heart sounds: Normal heart sounds.   Pulmonary:      Effort: Pulmonary effort is normal.      Breath sounds: Normal breath sounds.   Musculoskeletal:      Comments: Decreased range of motion in flexion, extension, rotation and to a lesser degree sidebending of the cervical spine.  Trapezius muscle spasm noted bilaterally.   Lymphadenopathy:      Cervical: No cervical adenopathy.   Skin:     General: Skin is warm and dry.      Findings: No rash.   Neurological:      General: No focal deficit present.      Mental Status: She is alert and oriented to person, place, and time.   Psychiatric:         Mood and Affect: Mood normal.         Behavior: Behavior normal.         Thought Content: Thought content normal.         Judgment: Judgment normal.         Procedures    Assessment/Plan   Mignon was seen today for neck pain.    Diagnoses and all orders for this visit:    Posterior neck pain  -     diclofenac (VOLTAREN) 75 MG EC tablet; Take 1 tablet by mouth 2 (Two) Times a Day As Needed (neck pain). Take with food  -     tiZANidine (ZANAFLEX) 4 MG tablet; Take 1 tablet by mouth Every 8 (Eight) Hours As Needed for Muscle Spasms.    patient should use ice only no heat and follow the exercises below.  Contact me if pain is not slowly improving over the next few weeks.      Patient Instructions   Neck Exercises  Ask your health care provider which exercises are safe for you. Do exercises exactly as told by your health care provider and adjust them as directed. It is normal to feel mild stretching, pulling, tightness, or discomfort as you do these exercises. Stop right away if you feel sudden pain or your pain gets worse. Do not begin these exercises  until told by your health care provider.  Neck exercises can be important for many reasons. They can improve strength and maintain flexibility in your neck, which will help your upper back and prevent neck pain.  Stretching exercises  Rotation neck stretching    1. Sit in a chair or stand up.  2. Place your feet flat on the floor, shoulder width apart.  3. Slowly turn your head (rotate) to the right until a slight stretch is felt. Turn it all the way to the right so you can look over your right shoulder. Do not tilt or tip your head.  4. Hold this position for 10-30 seconds.  5. Slowly turn your head (rotate) to the left until a slight stretch is felt. Turn it all the way to the left so you can look over your left shoulder. Do not tilt or tip your head.  6. Hold this position for 10-30 seconds.  Repeat __________ times. Complete this exercise __________ times a day.  Neck retraction  1. Sit in a sturdy chair or stand up.  2. Look straight ahead. Do not bend your neck.  3. Use your fingers to push your chin backward (retraction). Do not bend your neck for this movement. Continue to face straight ahead. If you are doing the exercise properly, you will feel a slight sensation in your throat and a stretch at the back of your neck.  4. Hold the stretch for 1-2 seconds.  Repeat __________ times. Complete this exercise __________ times a day.  Strengthening exercises  Neck press  1. Lie on your back on a firm bed or on the floor with a pillow under your head.  2. Use your neck muscles to push your head down on the pillow and straighten your spine.  3. Hold the position as well as you can. Keep your head facing up (in a neutral position) and your chin tucked.  4. Slowly count to 5 while holding this position.  Repeat __________ times. Complete this exercise __________ times a day.  Isometrics  These are exercises in which you strengthen the muscles in your neck while keeping your neck still (isometrics).  1. Sit in a  supportive chair and place your hand on your forehead.  2. Keep your head and face facing straight ahead. Do not flex or extend your neck while doing isometrics.  3. Push forward with your head and neck while pushing back with your hand. Hold for 10 seconds.  4. Do the sequence again, this time putting your hand against the back of your head. Use your head and neck to push backward against the hand pressure.  5. Finally, do the same exercise on either side of your head, pushing sideways against the pressure of your hand.  Repeat __________ times. Complete this exercise __________ times a day.  Prone head lifts  1. Lie face-down (prone position), resting on your elbows so that your chest and upper back are raised.  2. Start with your head facing downward, near your chest. Position your chin either on or near your chest.  3. Slowly lift your head upward. Lift until you are looking straight ahead. Then continue lifting your head as far back as you can comfortably stretch.  4. Hold your head up for 5 seconds. Then slowly lower it to your starting position.  Repeat __________ times. Complete this exercise __________ times a day.  Supine head lifts  1. Lie on your back (supine position), bending your knees to point to the ceiling and keeping your feet flat on the floor.  2. Lift your head slowly off the floor, raising your chin toward your chest.  3. Hold for 5 seconds.  Repeat __________ times. Complete this exercise __________ times a day.  Scapular retraction  1. Stand with your arms at your sides. Look straight ahead.  2. Slowly pull both shoulders (scapulae) backward and downward (retraction) until you feel a stretch between your shoulder blades in your upper back.  3. Hold for 10-30 seconds.  4. Relax and repeat.  Repeat __________ times. Complete this exercise __________ times a day.  Contact a health care provider if:  · Your neck pain or discomfort gets much worse when you do an exercise.  · Your neck pain or  discomfort does not improve within 2 hours after you exercise.  If you have any of these problems, stop exercising right away. Do not do the exercises again unless your health care provider says that you can.  Get help right away if:  · You develop sudden, severe neck pain.  If this happens, stop exercising right away. Do not do the exercises again unless your health care provider says that you can.  This information is not intended to replace advice given to you by your health care provider. Make sure you discuss any questions you have with your health care provider.  Document Released: 11/28/2016 Document Revised: 10/16/2019 Document Reviewed: 10/16/2019  [employer] Patient Education © 2020 [employer]

## 2020-10-01 NOTE — PATIENT INSTRUCTIONS
Neck Exercises  Ask your health care provider which exercises are safe for you. Do exercises exactly as told by your health care provider and adjust them as directed. It is normal to feel mild stretching, pulling, tightness, or discomfort as you do these exercises. Stop right away if you feel sudden pain or your pain gets worse. Do not begin these exercises until told by your health care provider.  Neck exercises can be important for many reasons. They can improve strength and maintain flexibility in your neck, which will help your upper back and prevent neck pain.  Stretching exercises  Rotation neck stretching    1. Sit in a chair or stand up.  2. Place your feet flat on the floor, shoulder width apart.  3. Slowly turn your head (rotate) to the right until a slight stretch is felt. Turn it all the way to the right so you can look over your right shoulder. Do not tilt or tip your head.  4. Hold this position for 10-30 seconds.  5. Slowly turn your head (rotate) to the left until a slight stretch is felt. Turn it all the way to the left so you can look over your left shoulder. Do not tilt or tip your head.  6. Hold this position for 10-30 seconds.  Repeat __________ times. Complete this exercise __________ times a day.  Neck retraction  1. Sit in a sturdy chair or stand up.  2. Look straight ahead. Do not bend your neck.  3. Use your fingers to push your chin backward (retraction). Do not bend your neck for this movement. Continue to face straight ahead. If you are doing the exercise properly, you will feel a slight sensation in your throat and a stretch at the back of your neck.  4. Hold the stretch for 1-2 seconds.  Repeat __________ times. Complete this exercise __________ times a day.  Strengthening exercises  Neck press  1. Lie on your back on a firm bed or on the floor with a pillow under your head.  2. Use your neck muscles to push your head down on the pillow and straighten your spine.  3. Hold the position  as well as you can. Keep your head facing up (in a neutral position) and your chin tucked.  4. Slowly count to 5 while holding this position.  Repeat __________ times. Complete this exercise __________ times a day.  Isometrics  These are exercises in which you strengthen the muscles in your neck while keeping your neck still (isometrics).  1. Sit in a supportive chair and place your hand on your forehead.  2. Keep your head and face facing straight ahead. Do not flex or extend your neck while doing isometrics.  3. Push forward with your head and neck while pushing back with your hand. Hold for 10 seconds.  4. Do the sequence again, this time putting your hand against the back of your head. Use your head and neck to push backward against the hand pressure.  5. Finally, do the same exercise on either side of your head, pushing sideways against the pressure of your hand.  Repeat __________ times. Complete this exercise __________ times a day.  Prone head lifts  1. Lie face-down (prone position), resting on your elbows so that your chest and upper back are raised.  2. Start with your head facing downward, near your chest. Position your chin either on or near your chest.  3. Slowly lift your head upward. Lift until you are looking straight ahead. Then continue lifting your head as far back as you can comfortably stretch.  4. Hold your head up for 5 seconds. Then slowly lower it to your starting position.  Repeat __________ times. Complete this exercise __________ times a day.  Supine head lifts  1. Lie on your back (supine position), bending your knees to point to the ceiling and keeping your feet flat on the floor.  2. Lift your head slowly off the floor, raising your chin toward your chest.  3. Hold for 5 seconds.  Repeat __________ times. Complete this exercise __________ times a day.  Scapular retraction  1. Stand with your arms at your sides. Look straight ahead.  2. Slowly pull both shoulders (scapulae) backward  and downward (retraction) until you feel a stretch between your shoulder blades in your upper back.  3. Hold for 10-30 seconds.  4. Relax and repeat.  Repeat __________ times. Complete this exercise __________ times a day.  Contact a health care provider if:  · Your neck pain or discomfort gets much worse when you do an exercise.  · Your neck pain or discomfort does not improve within 2 hours after you exercise.  If you have any of these problems, stop exercising right away. Do not do the exercises again unless your health care provider says that you can.  Get help right away if:  · You develop sudden, severe neck pain.  If this happens, stop exercising right away. Do not do the exercises again unless your health care provider says that you can.  This information is not intended to replace advice given to you by your health care provider. Make sure you discuss any questions you have with your health care provider.  Document Released: 11/28/2016 Document Revised: 10/16/2019 Document Reviewed: 10/16/2019  Elsevier Patient Education © 2020 [employer]

## 2021-03-04 ENCOUNTER — OFFICE VISIT (OUTPATIENT)
Dept: FAMILY MEDICINE CLINIC | Facility: CLINIC | Age: 26
End: 2021-03-04

## 2021-03-04 VITALS
DIASTOLIC BLOOD PRESSURE: 74 MMHG | HEART RATE: 100 BPM | SYSTOLIC BLOOD PRESSURE: 120 MMHG | TEMPERATURE: 97.3 F | HEIGHT: 63 IN | WEIGHT: 201 LBS | OXYGEN SATURATION: 97 % | RESPIRATION RATE: 18 BRPM | BODY MASS INDEX: 35.61 KG/M2

## 2021-03-04 DIAGNOSIS — Z30.41 ENCOUNTER FOR SURVEILLANCE OF CONTRACEPTIVE PILLS: ICD-10-CM

## 2021-03-04 DIAGNOSIS — Z30.46 NEXPLANON REMOVAL: Primary | ICD-10-CM

## 2021-03-04 PROBLEM — O34.41 LOW GRADE SQUAMOUS INTRAEPITHELIAL CERVICAL DYSPLASIA AFFECTING PREGNANCY IN FIRST TRIMESTER, ANTEPARTUM: Status: ACTIVE | Noted: 2017-10-16

## 2021-03-04 PROBLEM — O20.0 THREATENED ABORTION: Status: RESOLVED | Noted: 2019-02-08 | Resolved: 2021-03-04

## 2021-03-04 PROBLEM — J45.909 ASTHMA: Status: ACTIVE | Noted: 2021-03-04

## 2021-03-04 PROBLEM — F11.90 NARCOTIC DRUG USE: Status: RESOLVED | Noted: 2019-02-08 | Resolved: 2021-03-04

## 2021-03-04 PROBLEM — F17.200 SMOKER: Status: ACTIVE | Noted: 2017-10-03

## 2021-03-04 PROBLEM — O09.899 MATERNAL VARICELLA, NON-IMMUNE: Status: ACTIVE | Noted: 2017-10-10

## 2021-03-04 PROBLEM — O20.9 VAGINAL BLEEDING BEFORE 22 WEEKS GESTATION: Status: RESOLVED | Noted: 2017-10-05 | Resolved: 2021-03-04

## 2021-03-04 PROBLEM — O09.92 HIGH-RISK PREGNANCY, SECOND TRIMESTER: Status: ACTIVE | Noted: 2017-12-18

## 2021-03-04 PROBLEM — M54.16 LUMBAR RADICULOPATHY: Status: ACTIVE | Noted: 2018-06-28

## 2021-03-04 PROBLEM — R10.2 PELVIC PAIN IN FEMALE: Status: ACTIVE | Noted: 2021-03-04

## 2021-03-04 PROBLEM — F12.90 MARIJUANA USE: Status: ACTIVE | Noted: 2017-10-04

## 2021-03-04 PROBLEM — N83.209 CYST OF OVARY: Status: ACTIVE | Noted: 2021-03-04

## 2021-03-04 PROBLEM — Z86.19 HISTORY OF MONONUCLEOSIS: Status: ACTIVE | Noted: 2021-03-04

## 2021-03-04 PROBLEM — R87.612 LOW GRADE SQUAMOUS INTRAEPITHELIAL CERVICAL DYSPLASIA AFFECTING PREGNANCY IN FIRST TRIMESTER, ANTEPARTUM: Status: ACTIVE | Noted: 2017-10-16

## 2021-03-04 PROBLEM — M54.9 BACK PAIN: Status: ACTIVE | Noted: 2020-10-19

## 2021-03-04 PROBLEM — M25.311 SHOULDER INSTABILITY, RIGHT: Status: ACTIVE | Noted: 2020-10-19

## 2021-03-04 PROBLEM — Z86.59 HISTORY OF DEPRESSION: Status: ACTIVE | Noted: 2017-10-03

## 2021-03-04 PROBLEM — K52.9 ACUTE GASTROENTERITIS: Status: ACTIVE | Noted: 2018-06-09

## 2021-03-04 PROBLEM — Z28.39 MATERNAL VARICELLA, NON-IMMUNE: Status: ACTIVE | Noted: 2017-10-10

## 2021-03-04 PROBLEM — M51.26 PROTRUDED LUMBAR DISC: Status: ACTIVE | Noted: 2018-06-28

## 2021-03-04 PROBLEM — O20.9 VAGINAL BLEEDING BEFORE 22 WEEKS GESTATION: Status: ACTIVE | Noted: 2017-10-05

## 2021-03-04 PROCEDURE — 11982 REMOVE DRUG IMPLANT DEVICE: CPT | Performed by: FAMILY MEDICINE

## 2021-03-04 RX ORDER — GABAPENTIN 300 MG/1
300 CAPSULE ORAL 3 TIMES DAILY
COMMUNITY
Start: 2021-02-24

## 2021-03-04 RX ORDER — HYDROCODONE BITARTRATE AND ACETAMINOPHEN 10; 325 MG/1; MG/1
1 TABLET ORAL 3 TIMES DAILY PRN
COMMUNITY
Start: 2021-02-05

## 2021-03-04 RX ORDER — DESOGESTREL AND ETHINYL ESTRADIOL 0.15-0.03
1 KIT ORAL DAILY
Qty: 28 TABLET | Refills: 12 | Status: SHIPPED | OUTPATIENT
Start: 2021-03-04 | End: 2022-03-04

## 2021-03-04 NOTE — PROGRESS NOTES
Procedure: Nexplanon removal    Chief Complaint: Nexplanon removal    Procedures  Patient's last menstrual cycle was December 15, 2020.  They are not regular on the Nexplanon.  The Nexplanon was placed March 2019.  The patient has gained a lot of weight on the Nexplanon and also has irregular menstrual cycles, therefore she wanted removal early.    Pre Dx: 1) Nexplanon removal  Post Dx: 1) Nexplanon removal     The risks, benefits, and alternatives to remove the device have been discussed with the patient at length. All of her questions are answered. She is aware of the need for alternative means of contraception if desired. Verbal and written informed consent is obtained.    Able to easily palpate the device in the right arm. Additional imaging was not required. The device feels freely mobile and easily accessible. She was placed in the supine position with her arm elevated at her side. The skin over the Nexplanon site is prepped with Betadine. 2-3 mL of 1% lidocaine without epinephrine was injected at the distal end of the Nexplanon heydi where it was originally inserted. Downward pressure was applied on the proximal end of the implant, and a 2-3 mm incision was made in a longitudinal direction of the arm at the distal tip of the implant. The implant was then pushed gently toward the incision until the tip was visible. The fibrous capsule was opened with blunt dissection using a hemostat. The implant was grasp with a hemostat and was easily removed intact. It measured a  full 4 cm in length.    Tolerated well  No apparent complications    The skin was cleansed and dried. The arm was gently wrapped with Kerlex gauze with a pressure dressing and Coban. The patient had normal strength and sensation in her upper extremity. There was no significant bleeding. There were no complications. The patient was advised about proper care of the dressings. She was advised to call or return for complications such as fever, signs of  infection, increased pain, or any other concerns.    Warning signs, limitations and expectations reviewed.     Diagnoses and all orders for this visit:    1. Encounter for surveillance of contraceptive pills (Primary)  -     desogestrel-ethinyl estradiol (APRI) 0.15-30 MG-MCG per tablet; Take 1 tablet by mouth Daily.  Dispense: 28 tablet; Refill: 12    2. Nexplanon removal    RTO when due for pap, yearly PE    RTO No follow-ups on file.    Lisa Negro DO    3/4/2021  21:46 EST

## 2021-04-16 ENCOUNTER — BULK ORDERING (OUTPATIENT)
Dept: CASE MANAGEMENT | Facility: OTHER | Age: 26
End: 2021-04-16

## 2021-04-16 DIAGNOSIS — Z23 IMMUNIZATION DUE: ICD-10-CM

## 2021-12-23 ENCOUNTER — OFFICE VISIT (OUTPATIENT)
Dept: FAMILY MEDICINE CLINIC | Facility: CLINIC | Age: 26
End: 2021-12-23

## 2021-12-23 VITALS
BODY MASS INDEX: 31.38 KG/M2 | DIASTOLIC BLOOD PRESSURE: 75 MMHG | TEMPERATURE: 99 F | WEIGHT: 183.8 LBS | HEART RATE: 103 BPM | HEIGHT: 64 IN | OXYGEN SATURATION: 99 % | SYSTOLIC BLOOD PRESSURE: 110 MMHG

## 2021-12-23 DIAGNOSIS — R09.1 PLEURISY: ICD-10-CM

## 2021-12-23 DIAGNOSIS — J06.9 UPPER RESPIRATORY TRACT INFECTION, UNSPECIFIED TYPE: ICD-10-CM

## 2021-12-23 DIAGNOSIS — R05.9 COUGH: Primary | ICD-10-CM

## 2021-12-23 DIAGNOSIS — R51.9 ACUTE INTRACTABLE HEADACHE, UNSPECIFIED HEADACHE TYPE: ICD-10-CM

## 2021-12-23 LAB
EXPIRATION DATE: NORMAL
FLUAV AG NPH QL: NEGATIVE
FLUBV AG NPH QL: NEGATIVE
INTERNAL CONTROL: NORMAL
Lab: NORMAL

## 2021-12-23 PROCEDURE — 87804 INFLUENZA ASSAY W/OPTIC: CPT | Performed by: FAMILY MEDICINE

## 2021-12-23 PROCEDURE — 99214 OFFICE O/P EST MOD 30 MIN: CPT | Performed by: FAMILY MEDICINE

## 2021-12-23 PROCEDURE — 96372 THER/PROPH/DIAG INJ SC/IM: CPT | Performed by: FAMILY MEDICINE

## 2021-12-23 RX ORDER — IPRATROPIUM BROMIDE 42 UG/1
2 SPRAY, METERED NASAL 4 TIMES DAILY
Qty: 15 ML | Refills: 0 | Status: SHIPPED | OUTPATIENT
Start: 2021-12-23

## 2021-12-23 RX ORDER — KETOROLAC TROMETHAMINE 30 MG/ML
60 INJECTION, SOLUTION INTRAMUSCULAR; INTRAVENOUS ONCE
Status: COMPLETED | OUTPATIENT
Start: 2021-12-23 | End: 2021-12-23

## 2021-12-23 RX ORDER — DEXTROMETHORPHAN HYDROBROMIDE AND PROMETHAZINE HYDROCHLORIDE 15; 6.25 MG/5ML; MG/5ML
5 SYRUP ORAL 4 TIMES DAILY PRN
Qty: 180 ML | Refills: 0 | Status: SHIPPED | OUTPATIENT
Start: 2021-12-23

## 2021-12-23 RX ADMIN — KETOROLAC TROMETHAMINE 60 MG: 30 INJECTION, SOLUTION INTRAMUSCULAR; INTRAVENOUS at 12:07

## 2021-12-23 NOTE — PATIENT INSTRUCTIONS
Push fluids and rest.  Symptomatic treatment as needed.  Follow-up pending Covid test.  Try Tylenol ibuprofen for the headache.  If Covid test is negative, may do a trial of a steroid burst for pleurisy.

## 2021-12-23 NOTE — PROGRESS NOTES
"Chief Complaint  Pneumonia    Subjective          Mignon Vasques presents to [facility]  Started with HA 2 days ago.  Then started getting sharp pain in her back.  Hurts to touch.  Chest feels sore.  Has a bad cough.  Some what productive.  No runny nose.  Feels stuffy.  No ST.   No loss of smell and taste.   Not vaccinated for flu and covid.        Objective   Vital Signs:   /75 (BP Location: Right arm, Patient Position: Sitting)   Pulse 103   Temp 99 °F (37.2 °C)   Ht 162.5 cm (63.99\")   Wt 83.4 kg (183 lb 12.8 oz)   SpO2 99%   BMI 31.56 kg/m²     Physical Exam  Constitutional:       General: She is not in acute distress.     Appearance: Normal appearance. She is well-developed.   HENT:      Head: Normocephalic and atraumatic.      Right Ear: Tympanic membrane, ear canal and external ear normal.      Left Ear: Tympanic membrane, ear canal and external ear normal.      Nose: Congestion present. No rhinorrhea.      Mouth/Throat:      Pharynx: Posterior oropharyngeal erythema present. No oropharyngeal exudate.   Eyes:      Conjunctiva/sclera: Conjunctivae normal.      Pupils: Pupils are equal, round, and reactive to light.   Neck:      Thyroid: No thyromegaly.   Cardiovascular:      Rate and Rhythm: Normal rate.      Heart sounds: No murmur heard.      Pulmonary:      Effort: Pulmonary effort is normal.      Breath sounds: No decreased breath sounds, wheezing, rhonchi or rales.   Musculoskeletal:      Cervical back: Neck supple.      Right lower leg: No edema.      Left lower leg: No edema.   Lymphadenopathy:      Cervical: Cervical adenopathy present.   Neurological:      Mental Status: She is alert and oriented to person, place, and time.   Psychiatric:         Mood and Affect: Mood normal.         Behavior: Behavior normal.        Result Review :                 Assessment and Plan    Diagnoses and all orders for this visit:    1. Cough (Primary)  -     POC Influenza A / " B  -     COVID-19,[facility] ROUTINE, NP/OP SWAB IN TRANSPORT MEDIA OR ESWAB 72 HR TAT - Swab, Nasopharynx    2. Acute intractable headache, unspecified headache type  -     ketorolac (TORADOL) injection 60 mg    3. Upper respiratory tract infection, unspecified type  -     promethazine-dextromethorphan (PROMETHAZINE-DM) 6.25-15 MG/5ML syrup; Take 5 mL by mouth 4 (Four) Times a Day As Needed for Cough.  Dispense: 180 mL; Refill: 0  -     ipratropium (ATROVENT) 0.06 % nasal spray; 2 sprays into the nostril(s) as directed by provider 4 (Four) Times a Day.  Dispense: 15 mL; Refill: 0    4. Pleurisy  -     ketorolac (TORADOL) injection 60 mg    Cough-we will treat symptomatically for her URI will refer Covid test  Headache-do Tylenol ibuprofen, shot of Toradol to help with the pain  Pleurisy-Toradol now, will consider steroids if Covid test is negative    Follow Up   No follow-ups on file.  Patient was given instructions and counseling regarding her condition or for health maintenance advice. Please see specific information pulled into the AVS if appropriate.

## 2021-12-24 LAB
LABCORP SARS-COV-2, NAA 2 DAY TAT: NORMAL
SARS-COV-2 RNA RESP QL NAA+PROBE: DETECTED

## 2023-04-18 ENCOUNTER — OFFICE VISIT (OUTPATIENT)
Dept: OBSTETRICS AND GYNECOLOGY | Age: 28
End: 2023-04-18
Payer: COMMERCIAL

## 2023-04-18 ENCOUNTER — PATIENT ROUNDING (BHMG ONLY) (OUTPATIENT)
Dept: OBSTETRICS AND GYNECOLOGY | Age: 28
End: 2023-04-18
Payer: COMMERCIAL

## 2023-04-18 VITALS
WEIGHT: 191 LBS | DIASTOLIC BLOOD PRESSURE: 80 MMHG | BODY MASS INDEX: 33.84 KG/M2 | HEIGHT: 63 IN | SYSTOLIC BLOOD PRESSURE: 110 MMHG

## 2023-04-18 DIAGNOSIS — Z30.09 ENCOUNTER FOR OTHER GENERAL COUNSELING OR ADVICE ON CONTRACEPTION: ICD-10-CM

## 2023-04-18 DIAGNOSIS — N76.0 BV (BACTERIAL VAGINOSIS): ICD-10-CM

## 2023-04-18 DIAGNOSIS — B96.89 BV (BACTERIAL VAGINOSIS): ICD-10-CM

## 2023-04-18 DIAGNOSIS — Z12.4 SCREENING FOR MALIGNANT NEOPLASM OF THE CERVIX: ICD-10-CM

## 2023-04-18 DIAGNOSIS — N94.10 FEMALE DYSPAREUNIA: ICD-10-CM

## 2023-04-18 DIAGNOSIS — Z11.3 SCREEN FOR STD (SEXUALLY TRANSMITTED DISEASE): ICD-10-CM

## 2023-04-18 DIAGNOSIS — Z13.89 SCREENING FOR BLOOD OR PROTEIN IN URINE: ICD-10-CM

## 2023-04-18 DIAGNOSIS — Z01.419 ENCOUNTER FOR GYNECOLOGICAL EXAMINATION WITHOUT ABNORMAL FINDING: Primary | ICD-10-CM

## 2023-04-18 PROBLEM — O09.92 HIGH-RISK PREGNANCY, SECOND TRIMESTER: Status: RESOLVED | Noted: 2017-12-18 | Resolved: 2023-04-18

## 2023-04-18 PROBLEM — R87.612 LOW GRADE SQUAMOUS INTRAEPITHELIAL CERVICAL DYSPLASIA AFFECTING PREGNANCY IN FIRST TRIMESTER, ANTEPARTUM: Status: RESOLVED | Noted: 2017-10-16 | Resolved: 2023-04-18

## 2023-04-18 PROBLEM — Z28.39 MATERNAL VARICELLA, NON-IMMUNE: Status: RESOLVED | Noted: 2017-10-10 | Resolved: 2023-04-18

## 2023-04-18 PROBLEM — O09.899 MATERNAL VARICELLA, NON-IMMUNE: Status: RESOLVED | Noted: 2017-10-10 | Resolved: 2023-04-18

## 2023-04-18 PROBLEM — R10.2 PELVIC PAIN IN FEMALE: Status: RESOLVED | Noted: 2021-03-04 | Resolved: 2023-04-18

## 2023-04-18 PROBLEM — O34.41 LOW GRADE SQUAMOUS INTRAEPITHELIAL CERVICAL DYSPLASIA AFFECTING PREGNANCY IN FIRST TRIMESTER, ANTEPARTUM: Status: RESOLVED | Noted: 2017-10-16 | Resolved: 2023-04-18

## 2023-04-18 LAB
BILIRUB BLD-MCNC: NEGATIVE MG/DL
GLUCOSE UR STRIP-MCNC: NEGATIVE MG/DL
KETONES UR QL: NEGATIVE
LEUKOCYTE EST, POC: NEGATIVE
NITRITE UR-MCNC: NEGATIVE MG/ML
PH UR: 5.5 [PH] (ref 5–8)
PROT UR STRIP-MCNC: NEGATIVE MG/DL
RBC # UR STRIP: NEGATIVE /UL
SP GR UR: 1.03 (ref 1–1.03)
UROBILINOGEN UR QL: NORMAL

## 2023-04-18 RX ORDER — ELETRIPTAN HYDROBROMIDE 20 MG/1
TABLET, FILM COATED ORAL
COMMUNITY
Start: 2023-03-27

## 2023-04-18 RX ORDER — GABAPENTIN 800 MG/1
1 TABLET ORAL 3 TIMES DAILY
COMMUNITY
Start: 2023-03-30

## 2023-04-18 RX ORDER — BUPRENORPHINE HYDROCHLORIDE AND NALOXONE HYDROCHLORIDE DIHYDRATE 8; 2 MG/1; MG/1
TABLET SUBLINGUAL
COMMUNITY
Start: 2023-02-18

## 2023-04-18 RX ORDER — NORGESTIMATE AND ETHINYL ESTRADIOL 7DAYSX3 28
1 KIT ORAL DAILY
COMMUNITY
Start: 2023-02-23 | End: 2023-04-18

## 2023-04-18 RX ORDER — TRAZODONE HYDROCHLORIDE 50 MG/1
TABLET ORAL
COMMUNITY
Start: 2023-03-27

## 2023-04-18 RX ORDER — DULOXETIN HYDROCHLORIDE 30 MG/1
30 CAPSULE, DELAYED RELEASE ORAL DAILY
Qty: 30 CAPSULE | Refills: 11 | COMMUNITY
Start: 2023-03-27 | End: 2024-03-26

## 2023-04-18 NOTE — PROGRESS NOTES
"Subjective   Mignon Vasques is a 27 y.o. female presents as new pt for annual visit today - no pap on file never had one  - also wants discuss contraception - previously on NR - neplanon - bcp , currently on bcp - periods are heavy and painful with migraines . Pt mom was adopted she does not know father side family so not much family hx .no longer desires to have children.   Discussed options including Paragard and Kyleena - would like to try Paragard first.       History of Present Illness    The following portions of the patient's history were reviewed and updated as appropriate: allergies, current medications, past family history, past medical history, past social history, past surgical history and problem list.    Review of Systems   Constitutional: Negative for chills, fatigue and fever.   Gastrointestinal: Negative for abdominal distention and abdominal pain.   Genitourinary: Positive for dyspareunia and menstrual problem. Negative for dysuria, pelvic pain, vaginal bleeding, vaginal discharge and vaginal pain.   All other systems reviewed and are negative.    /80   Ht 160 cm (63\")   Wt 86.6 kg (191 lb)   LMP 03/24/2023 (Approximate)   BMI 33.83 kg/m²     Objective   Physical Exam  Vitals and nursing note reviewed.   Constitutional:       Appearance: Normal appearance. She is well-developed.   Neck:      Thyroid: No thyromegaly.   Pulmonary:      Effort: Pulmonary effort is normal.   Chest:   Breasts:     Right: No mass, nipple discharge, skin change or tenderness.      Left: No mass, nipple discharge, skin change or tenderness.   Abdominal:      General: There is no distension.      Palpations: Abdomen is soft.      Tenderness: There is no abdominal tenderness.   Genitourinary:     General: Normal vulva.      Exam position: Lithotomy position.      Labia:         Right: No rash or lesion.         Left: No rash or lesion.       Vagina: Normal. No vaginal discharge or bleeding.      Cervix: Cervical " motion tenderness present. No friability, lesion or cervical bleeding.      Uterus: Not enlarged and not tender.       Adnexa:         Right: No mass or tenderness.          Left: No mass or tenderness.     Musculoskeletal:         General: Normal range of motion.      Cervical back: Normal range of motion.   Skin:     General: Skin is warm and dry.      Findings: No rash.   Neurological:      Mental Status: She is alert and oriented to person, place, and time.   Psychiatric:         Mood and Affect: Mood normal.         Behavior: Behavior normal.           Assessment & Plan   Diagnoses and all orders for this visit:    1. Encounter for gynecological examination without abnormal finding (Primary)    2. Screening for malignant neoplasm of the cervix  -     IGP, Rfx Aptima HPV ASCU    3. Screening for blood or protein in urine  -     POC Urinalysis Dipstick    4. Screen for STD (sexually transmitted disease)  -     NuSwab VG+ - Swab, Vagina    5. Female dyspareunia    6. Encounter for other general counseling or advice on contraception        Counseling was given to patient for the following topics: instructions for management, risks and benefits of treatment options, importance of treatment compliance and self-breast exams .  Return in about 6 weeks (around 5/30/2023), or Paragard insertion.

## 2023-04-18 NOTE — PROGRESS NOTES
A MY CHART MESSAGE HAS BEEN SENT TO THE PATIENT FOR [facility].

## 2023-04-20 LAB
A VAGINAE DNA VAG QL NAA+PROBE: ABNORMAL SCORE
BVAB2 DNA VAG QL NAA+PROBE: ABNORMAL SCORE
C ALBICANS DNA VAG QL NAA+PROBE: NEGATIVE
C GLABRATA DNA VAG QL NAA+PROBE: NEGATIVE
C TRACH DNA VAG QL NAA+PROBE: NEGATIVE
MEGA1 DNA VAG QL NAA+PROBE: ABNORMAL SCORE
N GONORRHOEA DNA VAG QL NAA+PROBE: NEGATIVE
T VAGINALIS DNA VAG QL NAA+PROBE: NEGATIVE

## 2023-04-20 RX ORDER — METRONIDAZOLE 500 MG/1
500 TABLET ORAL 2 TIMES DAILY
Qty: 14 TABLET | Refills: 0 | Status: SHIPPED | OUTPATIENT
Start: 2023-04-20 | End: 2023-04-27

## 2023-04-24 LAB
CONV .: NORMAL
CYTOLOGIST CVX/VAG CYTO: NORMAL
CYTOLOGY CVX/VAG DOC CYTO: NORMAL
CYTOLOGY CVX/VAG DOC THIN PREP: NORMAL
DX ICD CODE: NORMAL
HIV 1 & 2 AB SER-IMP: NORMAL
OTHER STN SPEC: NORMAL
STAT OF ADQ CVX/VAG CYTO-IMP: NORMAL

## 2023-05-09 ENCOUNTER — TELEPHONE (OUTPATIENT)
Dept: OBSTETRICS AND GYNECOLOGY | Age: 28
End: 2023-05-09

## 2023-05-09 RX ORDER — FLUCONAZOLE 150 MG/1
150 TABLET ORAL ONCE
Qty: 1 TABLET | Refills: 0 | Status: SHIPPED | OUTPATIENT
Start: 2023-05-09 | End: 2023-05-09

## 2023-05-09 NOTE — TELEPHONE ENCOUNTER
Caller: Mignon Vasques    Relationship: Self    Best call back number: [phone] - LVM    Requested Prescriptions: DIFLUCAN    Pharmacy where request should be sent:  [facility]    Last office visit with prescribing clinician: 4/18/2023   Last telemedicine visit with prescribing clinician: Visit date not found   Next office visit with prescribing clinician: 5/30/2023     PT'S PCP PROVIDE HER A RX WHICH CAUSED THE PT TO HAVE A YEAST INFECTION - DISCHARGE,  ITCHY AND IRRITATED - THE PCP SAID TO CALL GYN TO CALL INTO THE DIFLUCAN - THE PT WOULD RATHER TO HAVE THE DIFLUCAN CALLED IN BECAUSE SHE IS ALSO ON HER CYCLE - PLEASE CALL PT TO LET HER KNOW ONCE THIS CAN BE CALLED IN    THANK YOU!

## 2024-04-02 RX ORDER — DEXTROMETHORPHAN HYDROBROMIDE AND PROMETHAZINE HYDROCHLORIDE 15; 6.25 MG/5ML; MG/5ML
SYRUP ORAL
Qty: 180 ML | Refills: 0 | OUTPATIENT
Start: 2024-04-02

## 2024-05-13 ENCOUNTER — OFFICE VISIT (OUTPATIENT)
Dept: FAMILY MEDICINE CLINIC | Facility: CLINIC | Age: 29
End: 2024-05-13
Payer: COMMERCIAL

## 2024-05-13 VITALS
TEMPERATURE: 98.2 F | WEIGHT: 196.2 LBS | DIASTOLIC BLOOD PRESSURE: 68 MMHG | HEIGHT: 63 IN | SYSTOLIC BLOOD PRESSURE: 98 MMHG | HEART RATE: 84 BPM | OXYGEN SATURATION: 96 % | BODY MASS INDEX: 34.76 KG/M2

## 2024-05-13 DIAGNOSIS — J02.9 SORE THROAT: ICD-10-CM

## 2024-05-13 DIAGNOSIS — J45.20 MILD INTERMITTENT ASTHMA, UNSPECIFIED WHETHER COMPLICATED: ICD-10-CM

## 2024-05-13 DIAGNOSIS — R09.81 CONGESTION OF NASAL SINUS: Primary | ICD-10-CM

## 2024-05-13 LAB
EXPIRATION DATE: NORMAL
FLUAV AG NPH QL: NEGATIVE
FLUBV AG NPH QL: NEGATIVE
INTERNAL CONTROL: NORMAL
Lab: NORMAL
S PYO AG THROAT QL: NEGATIVE
SARS-COV-2 AG UPPER RESP QL IA.RAPID: NOT DETECTED

## 2024-05-13 RX ORDER — BROMPHENIRAMINE MALEATE, PSEUDOEPHEDRINE HYDROCHLORIDE, AND DEXTROMETHORPHAN HYDROBROMIDE 2; 30; 10 MG/5ML; MG/5ML; MG/5ML
5 SYRUP ORAL 4 TIMES DAILY PRN
Qty: 180 ML | Refills: 0 | Status: SHIPPED | OUTPATIENT
Start: 2024-05-13

## 2024-05-13 RX ORDER — SERTRALINE HYDROCHLORIDE 25 MG/1
25 TABLET, FILM COATED ORAL DAILY
COMMUNITY

## 2024-05-13 RX ORDER — ALBUTEROL SULFATE 90 UG/1
2 AEROSOL, METERED RESPIRATORY (INHALATION) EVERY 4 HOURS PRN
Qty: 18 G | Refills: 0 | Status: SHIPPED | OUTPATIENT
Start: 2024-05-13

## 2024-05-13 NOTE — PROGRESS NOTES
"Chief Complaint  URI (Thinks she may have bronchitis. Did not wanna be tested for covid and flu. )    Subjective        Mignon Vasques presents to [facility]  History of Present Illness      Patient is here today with complaint of sore throat that started 3 days ago.   Had phelgm production that was thick and had blood in it.  Then she had chest congestion/nasal congestion/discharge, cough with no sputum production.      Reports fever, ear pain/pressure, sore throat, shortness of breath.     Denies nausea, vomiting, diarrhea      Objective   Vital Signs:  BP 98/68   Pulse 84   Temp 98.2 °F (36.8 °C)   Ht 160 cm (62.99\")   Wt 89 kg (196 lb 3.2 oz)   SpO2 96%   BMI 34.76 kg/m²   Estimated body mass index is 34.76 kg/m² as calculated from the following:    Height as of this encounter: 160 cm (62.99\").    Weight as of this encounter: 89 kg (196 lb 3.2 oz).             Physical Exam  Constitutional:       Appearance: Normal appearance.   HENT:      Head: Normocephalic and atraumatic.      Right Ear: Tympanic membrane has decreased mobility.      Left Ear: Tympanic membrane has decreased mobility.      Nose: Rhinorrhea present.      Right Sinus: No maxillary sinus tenderness or frontal sinus tenderness.      Left Sinus: No maxillary sinus tenderness or frontal sinus tenderness.      Mouth/Throat:      Pharynx: Posterior oropharyngeal erythema present.   Cardiovascular:      Rate and Rhythm: Normal rate and regular rhythm.      Pulses: Normal pulses.   Pulmonary:      Effort: Pulmonary effort is normal.      Breath sounds: Normal breath sounds.   Skin:     General: Skin is warm and dry.   Neurological:      Mental Status: She is alert.   Psychiatric:         Mood and Affect: Mood normal.         Behavior: Behavior normal.         Thought Content: Thought content normal.         Judgment: Judgment normal.        Result Review :          Strep is negative  COVID is negative  Influenza a " and B are negative           Assessment and Plan     Diagnoses and all orders for this visit:    1. Congestion of nasal sinus (Primary)  -     POCT Influenza A/B  -     POCT SARS-CoV-2 Antigen LUCA  -     POC Rapid Strep A    2. Sore throat  -     POCT Influenza A/B  -     POCT SARS-CoV-2 Antigen LUCA  -     POC Rapid Strep A    3. Mild intermittent asthma, unspecified whether complicated  -     albuterol sulfate  (90 Base) MCG/ACT inhaler; Inhale 2 puffs Every 4 (Four) Hours As Needed for Wheezing.  Dispense: 18 g; Refill: 0    Other orders  -     brompheniramine-pseudoephedrine-DM 30-2-10 MG/5ML syrup; Take 5 mL by mouth 4 (Four) Times a Day As Needed for Congestion or Cough.  Dispense: 180 mL; Refill: 0      Treating for URI.  Use cough medicine as needed.  Refill for albuterol given.  If no improvement follow-up as needed.  She verbalized understanding is agreeable.         Follow Up     No follow-ups on file.  Patient was given instructions and counseling regarding her condition or for health maintenance advice. Please see specific information pulled into the AVS if appropriate.

## 2024-06-17 ENCOUNTER — TELEPHONE (OUTPATIENT)
Dept: FAMILY MEDICINE CLINIC | Facility: CLINIC | Age: 29
End: 2024-06-17

## 2024-06-17 NOTE — TELEPHONE ENCOUNTER
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 PATIENT IS REQUESTING IF THIS REFERRAL CA BE PLACED AS SOON AS POSSIBLE AS IT IS URGENT.

## 2024-06-17 NOTE — TELEPHONE ENCOUNTER
SPOKE TO PT AND INFORMED HER THAT SHE WILL NEED TO BE SEEN BEFORE REFERRAL CAN BE MADE.  SCHEDULED FOR THURSDAY

## 2024-08-29 ENCOUNTER — OFFICE VISIT (OUTPATIENT)
Dept: OBSTETRICS AND GYNECOLOGY | Age: 29
End: 2024-08-29
Payer: COMMERCIAL

## 2024-08-29 VITALS
SYSTOLIC BLOOD PRESSURE: 110 MMHG | DIASTOLIC BLOOD PRESSURE: 72 MMHG | HEIGHT: 63 IN | WEIGHT: 194 LBS | BODY MASS INDEX: 34.38 KG/M2

## 2024-08-29 DIAGNOSIS — O99.331: ICD-10-CM

## 2024-08-29 DIAGNOSIS — F41.9 ANXIETY: ICD-10-CM

## 2024-08-29 DIAGNOSIS — Z34.91 PRENATAL CARE IN FIRST TRIMESTER: ICD-10-CM

## 2024-08-29 DIAGNOSIS — Z3A.01 6 WEEKS GESTATION OF PREGNANCY: Primary | ICD-10-CM

## 2024-08-29 DIAGNOSIS — O21.9 NAUSEA/VOMITING IN PREGNANCY: ICD-10-CM

## 2024-08-29 DIAGNOSIS — F11.11 HISTORY OF OPIOID ABUSE: ICD-10-CM

## 2024-08-29 DIAGNOSIS — F32.A DEPRESSION, UNSPECIFIED DEPRESSION TYPE: ICD-10-CM

## 2024-08-29 RX ORDER — BUPRENORPHINE 96 MG/.27ML
INJECTION SUBCUTANEOUS
COMMUNITY
Start: 2024-05-17

## 2024-08-29 RX ORDER — BUPRENORPHINE 128 MG/.36ML
INJECTION SUBCUTANEOUS
COMMUNITY
Start: 2024-08-02

## 2024-08-29 RX ORDER — PROMETHAZINE HYDROCHLORIDE 12.5 MG/1
12.5 TABLET ORAL EVERY 6 HOURS PRN
Qty: 30 TABLET | Refills: 1 | Status: SHIPPED | OUTPATIENT
Start: 2024-08-29

## 2024-08-29 RX ORDER — PANTOPRAZOLE SODIUM 20 MG/1
40 TABLET, DELAYED RELEASE ORAL DAILY
COMMUNITY

## 2024-08-29 RX ORDER — DOXYLAMINE SUCCINATE AND PYRIDOXINE HYDROCHLORIDE 20; 20 MG/1; MG/1
1 TABLET, EXTENDED RELEASE ORAL 2 TIMES DAILY
Qty: 60 TABLET | Refills: 0 | Status: SHIPPED | OUTPATIENT
Start: 2024-08-29 | End: 2024-08-29

## 2024-08-29 RX ORDER — CEFDINIR 300 MG/1
300 CAPSULE ORAL
COMMUNITY
Start: 2024-08-24

## 2024-08-29 RX ORDER — PREDNISONE 20 MG/1
40 TABLET ORAL DAILY
COMMUNITY
Start: 2024-08-27

## 2024-08-29 RX ORDER — FLUTICASONE PROPIONATE 220 UG/1
1 AEROSOL, METERED RESPIRATORY (INHALATION)
COMMUNITY
Start: 2024-08-27

## 2024-08-29 NOTE — PROGRESS NOTES
Subjective     History of Present Illness  Mignon Vasques is a 28 y.o.  female is being seen today for   Chief Complaint   Patient presents with    Follow-up     Gyn f/u pregnancy confirmation & u/s- last pap 2023 Neg c/o nausea      Patient here today for pregnancy confirmation. LMP 2024. TVUS today shows single viable IUP at 6w1d,  bpm. Basis for EDC from ultrasound. Patient of Dr. Momin.   C/o nausea. No vomiting. Reports she has been sick lately, coughing with green mucus. Currently on steroid inhaler, prednisone, and cefdinir.   Hx opioid use, describes this began after having her neck fusion surgery several years ago and was cut off from medication. Has been on treatment for 2 years, previously was on suboxone, she is now on brixadi (buprenorphine) injections monthly. Reports no other drug use. She is agreeable to urine drug screen today.  Hx ruptured discs on back L4 and L5. Takes gabapentin 800 mg TID for this, from pain management. Requests note to give to pain management tomorrow so she can continue refills while pregnant. She is also curious if she could do epidural treatments for her back while pregnant.  Also taking cymbalta for anxiety/depression, feels stable on this. Denies SI or self-harm.   Currently smoking, 1 pack/day. She would like to quit and is interested in treatment for this.   Hx 3 vaginal deliveries.  Not taking prenatal vitamins. Requests prenatal gummies.   Pap smear utd, normal 2023.   Agreeable to genetic and carrier testing at next appointment. Wants to know gender.  ZIA Whitaker, is with her for today's appointment.    Relevant data reviewed:  US Ob Transvaginal (2024 13:08)   IGP, Rfx Aptima HPV ASCU (2023 16:13)     The following portions of the patient's history were reviewed and updated as appropriate: allergies, current medications, past family history, past medical history, past social history, past surgical history and problem list.    BP  "110/72   Ht 160 cm (62.99\")   Wt 88 kg (194 lb)   LMP 07/01/2024 (Approximate)   BMI 34.38 kg/m²         Review of Systems   Constitutional: Negative.    HENT: Negative.     Eyes: Negative.    Respiratory: Negative.     Cardiovascular: Negative.    Gastrointestinal:  Positive for nausea.   Endocrine: Negative.    Genitourinary: Negative.    Musculoskeletal: Negative.    Skin: Negative.    Allergic/Immunologic: Negative.    Neurological: Negative.    Hematological: Negative.    Psychiatric/Behavioral: Negative.         Objective   Physical Exam  Constitutional:       General: She is not in acute distress.  Cardiovascular:      Rate and Rhythm: Normal rate and regular rhythm.      Pulses: Normal pulses.      Heart sounds: Normal heart sounds.   Pulmonary:      Effort: Pulmonary effort is normal.      Breath sounds: Normal breath sounds.   Neurological:      Mental Status: She is alert and oriented to person, place, and time.   Psychiatric:         Mood and Affect: Mood normal.         Behavior: Behavior normal.         Thought Content: Thought content normal.         Judgment: Judgment normal.           Assessment & Plan   Diagnoses and all orders for this visit:    1. 6 weeks gestation of pregnancy (Primary)  -     ABO / Rh  -     Hepatitis B Surface Antigen  -     Hepatitis C Antibody  -     Antibody Screen  -     CBC & Differential  -     Urine Culture - Urine, Urine, Clean Catch  -     Rubella Antibody, IgG  -     Varicella Zoster Antibody, IgG  -     HIV-1 / O / 2 Ag / Antibody  -     Hemoglobin A1c  -     Hemoglobinopathy Fractionation Cascade  -     RPR, Rfx Qn RPR / Confirm TP  -     Chlamydia trachomatis, Neisseria gonorrhoeae, PCR - Urine, Urine, Clean Catch  -     Drug Profile Urine - 9 Drugs - Urine, Clean Catch  -     PRENATAL GUMMY VITAMIN; Chew 1 each Daily.  Dispense: 30 each; Refill: 11    2. Prenatal care in first trimester  -     ABO / Rh  -     Hepatitis B Surface Antigen  -     Hepatitis C " Antibody  -     Antibody Screen  -     CBC & Differential  -     Urine Culture - Urine, Urine, Clean Catch  -     Rubella Antibody, IgG  -     Varicella Zoster Antibody, IgG  -     HIV-1 / O / 2 Ag / Antibody  -     Hemoglobin A1c  -     Hemoglobinopathy Fractionation Cascade  -     RPR, Rfx Qn RPR / Confirm TP  -     Chlamydia trachomatis, Neisseria gonorrhoeae, PCR - Urine, Urine, Clean Catch  -     Drug Profile Urine - 9 Drugs - Urine, Clean Catch  -     PRENATAL GUMMY VITAMIN; Chew 1 each Daily.  Dispense: 30 each; Refill: 11    3. Nausea/vomiting in pregnancy  -     Discontinue: doxylamine-pyridoxine ER (Bonjesta) 20-20 MG tablet controlled-release tablet; Take 1 tablet by mouth 2 (Two) Times a Day.  Dispense: 60 tablet; Refill: 0  -     promethazine (PHENERGAN) 12.5 MG tablet; Take 1 tablet by mouth Every 6 (Six) Hours As Needed for Nausea or Vomiting.  Dispense: 30 tablet; Refill: 1    4. Tobacco smoking affecting pregnancy, antepartum, first trimester  -     nicotine polacrilex (NICORETTE) 4 MG gum; Chew 1 each As Needed for Smoking Cessation.  Dispense: 50 each; Refill: 1    5. History of opioid abuse - on buprenonorphine therapy    6. Anxiety    7. Depression, unspecified depression type    --New OB labs completed today, Will call patient with results and treat accordingly.  --Prenatal gummies sent to pharmacy.  --Nausea/vomiting: bonjesta sent to pharmacy, pharmacy called and was out of stock and patient needed medication today. Promethazine sent to pharmacy instead.   --Hx opioid abuse: continue brixadi (buprenorphine) injections   --Anxiety/depression: continue cymbalta. Informed pt to call 911 for any suicidal ideation or self-harm thoughts  --Pain management for ruptured discs in back: continue gabapentin for now, will send note to Dr. Momin who will have final decision on medications. If note is needed for gabapentin refills, informed pt Dr. Momin will need to provide this and could likely upload on  [facility] if she agrees to continue use.   --Tobacco smoking: discussed continued replacement therapy options of patches, gum, lozenges.  Patient would like to try nicotine gum first.  Nicotine gum sent to pharmacy, informed patient to call if she would like to try other methods.  Encouraged smoking cessation.  --Upper respiratory infection/sinus infection: encouraged pt to complete treatment of steroid inhalers, prednisone, and cefdinir. Can also take allergy medication, Claritin or zyrtec daily. If symptoms do not improve, informed pt to contact her PCP or go to urgent care.     Early pregnancy counseling provided and New OB folder given  Patient is taking Prenatal vitamins  Problem list reviewed and updated  Reviewed routine prenatal care with the office to include but not limited to expected weight gain during pregnancy, Tylenol products are fine, avoid aspirin and ibuprofen; not to change cat litter; food restrictions; avoidance of alcohol, tobacco, drugs and saunas/hot tubs. Discussed that the COVID and Flu vaccine is safe and recommended in pregnancy.   SAB warnings reviewed    All questions answered. Patient verbalizes understanding and is agreeable to plan.  Return in about 4 weeks (around 9/26/2024) for OB intake with Dr. Momin.

## 2024-08-30 LAB
AMPHETAMINES UR QL SCN: NEGATIVE NG/ML
BARBITURATES UR QL SCN: NEGATIVE NG/ML
BENZODIAZ UR QL: NEGATIVE NG/ML
BZE UR QL: NEGATIVE NG/ML
CANNABINOIDS UR QL SCN: NEGATIVE NG/ML
METHADONE UR QL SCN: NEGATIVE NG/ML
OPIATES UR QL: NEGATIVE NG/ML
PCP UR QL SCN: NEGATIVE NG/ML
PROPOXYPH UR QL SCN: NEGATIVE NG/ML

## 2024-08-31 LAB
ABO GROUP BLD: NORMAL
BACTERIA UR CULT: NO GROWTH
BACTERIA UR CULT: NORMAL
BASOPHILS # BLD AUTO: 0 X10E3/UL (ref 0–0.2)
BASOPHILS NFR BLD AUTO: 0 %
BLD GP AB SCN SERPL QL: NEGATIVE
C TRACH RRNA SPEC QL NAA+PROBE: NEGATIVE
EOSINOPHIL # BLD AUTO: 0.1 X10E3/UL (ref 0–0.4)
EOSINOPHIL NFR BLD AUTO: 1 %
ERYTHROCYTE [DISTWIDTH] IN BLOOD BY AUTOMATED COUNT: 12.5 % (ref 11.7–15.4)
HBA1C MFR BLD: 5.6 % (ref 4.8–5.6)
HBV SURFACE AG SERPL QL IA: NEGATIVE
HCT VFR BLD AUTO: 41.1 % (ref 34–46.6)
HCV IGG SERPL QL IA: NON REACTIVE
HGB A MFR BLD ELPH: 96.5 % (ref 96.4–98.8)
HGB A2 MFR BLD ELPH: 2.7 % (ref 1.8–3.2)
HGB BLD-MCNC: 13.5 G/DL (ref 11.1–15.9)
HGB F MFR BLD ELPH: 0.8 % (ref 0–2)
HGB FRACT BLD-IMP: NORMAL
HGB S MFR BLD ELPH: 0 %
HIV 1+2 AB+HIV1 P24 AG SERPL QL IA: NON REACTIVE
IMM GRANULOCYTES # BLD AUTO: 0 X10E3/UL (ref 0–0.1)
IMM GRANULOCYTES NFR BLD AUTO: 0 %
LYMPHOCYTES # BLD AUTO: 2.8 X10E3/UL (ref 0.7–3.1)
LYMPHOCYTES NFR BLD AUTO: 25 %
MCH RBC QN AUTO: 31.9 PG (ref 26.6–33)
MCHC RBC AUTO-ENTMCNC: 32.8 G/DL (ref 31.5–35.7)
MCV RBC AUTO: 97 FL (ref 79–97)
MONOCYTES # BLD AUTO: 0.7 X10E3/UL (ref 0.1–0.9)
MONOCYTES NFR BLD AUTO: 6 %
N GONORRHOEA RRNA SPEC QL NAA+PROBE: NEGATIVE
NEUTROPHILS # BLD AUTO: 7.5 X10E3/UL (ref 1.4–7)
NEUTROPHILS NFR BLD AUTO: 68 %
PLATELET # BLD AUTO: 262 X10E3/UL (ref 150–450)
RBC # BLD AUTO: 4.23 X10E6/UL (ref 3.77–5.28)
RH BLD: POSITIVE
RPR SER QL: NON REACTIVE
RUBV IGG SERPL IA-ACNC: 2.75 INDEX
VZV IGG SER IA-ACNC: <135 INDEX
WBC # BLD AUTO: 11.2 X10E3/UL (ref 3.4–10.8)

## 2024-09-03 ENCOUNTER — TELEPHONE (OUTPATIENT)
Dept: OBSTETRICS AND GYNECOLOGY | Age: 29
End: 2024-09-03
Payer: COMMERCIAL

## 2024-09-03 NOTE — TELEPHONE ENCOUNTER
Provider: LEILA MARK    Caller: SUZI MARQUEZ     Relationship to Patient: SELF         Reason for Call: PT HAS NOT BEEN FEELING WELL TODAY - SHE IS SHAKY FEELING AND SICK TO HER STOMACH - SHE TESTED HER BLOOD SUGAR AND IT WAS 68 - PT HAS ATE AND DRANK SWEET TEA AND NOTHING HAS BROUGHT IT UP PT WOULD LIKE TO BE ADVISED PLEASE CALL        No

## 2024-09-05 PROBLEM — Z28.39 MATERNAL VARICELLA, NON-IMMUNE: Status: ACTIVE | Noted: 2024-09-05

## 2024-09-05 PROBLEM — O09.899 MATERNAL VARICELLA, NON-IMMUNE: Status: ACTIVE | Noted: 2024-09-05

## 2024-09-10 NOTE — TELEPHONE ENCOUNTER
I called pt to follow up (see if she went to er or if she was better) to see how she was doing following last weeks problem. No answer, I lvm

## 2024-09-24 ENCOUNTER — INITIAL PRENATAL (OUTPATIENT)
Dept: OBSTETRICS AND GYNECOLOGY | Age: 29
End: 2024-09-24
Payer: COMMERCIAL

## 2024-09-24 VITALS — SYSTOLIC BLOOD PRESSURE: 122 MMHG | BODY MASS INDEX: 34.55 KG/M2 | WEIGHT: 195 LBS | DIASTOLIC BLOOD PRESSURE: 68 MMHG

## 2024-09-24 DIAGNOSIS — F41.9 ANXIOUS MOOD: ICD-10-CM

## 2024-09-24 DIAGNOSIS — Z31.5 ENCOUNTER FOR PROCREATIVE GENETIC COUNSELING: ICD-10-CM

## 2024-09-24 DIAGNOSIS — O99.611 CONSTIPATION DURING PREGNANCY IN FIRST TRIMESTER: ICD-10-CM

## 2024-09-24 DIAGNOSIS — Z34.81 PRENATAL CARE, SUBSEQUENT PREGNANCY, FIRST TRIMESTER: ICD-10-CM

## 2024-09-24 DIAGNOSIS — F19.11 HISTORY OF SUBSTANCE ABUSE: ICD-10-CM

## 2024-09-24 DIAGNOSIS — K59.00 CONSTIPATION DURING PREGNANCY IN FIRST TRIMESTER: ICD-10-CM

## 2024-09-24 DIAGNOSIS — M54.16 LUMBAR RADICULOPATHY: ICD-10-CM

## 2024-09-24 DIAGNOSIS — Z28.39 MATERNAL VARICELLA, NON-IMMUNE: ICD-10-CM

## 2024-09-24 DIAGNOSIS — O99.321 BUPRENORPHINE MAINTENANCE TREATMENT AFFECTING PREGNANCY IN FIRST TRIMESTER: ICD-10-CM

## 2024-09-24 DIAGNOSIS — Z86.59 HISTORY OF DEPRESSION: ICD-10-CM

## 2024-09-24 DIAGNOSIS — F11.20 BUPRENORPHINE MAINTENANCE TREATMENT AFFECTING PREGNANCY IN FIRST TRIMESTER: ICD-10-CM

## 2024-09-24 DIAGNOSIS — O09.899 MATERNAL VARICELLA, NON-IMMUNE: ICD-10-CM

## 2024-09-24 DIAGNOSIS — Z34.81 PRENATAL CARE, SUBSEQUENT PREGNANCY IN FIRST TRIMESTER: Primary | ICD-10-CM

## 2024-09-24 PROBLEM — Z86.19 HISTORY OF MONONUCLEOSIS: Status: RESOLVED | Noted: 2021-03-04 | Resolved: 2024-09-24

## 2024-09-24 PROBLEM — N94.10 FEMALE DYSPAREUNIA: Status: RESOLVED | Noted: 2023-04-18 | Resolved: 2024-09-24

## 2024-09-24 PROBLEM — O99.320 BUPRENORPHINE MAINTENANCE TREATMENT AFFECTING PREGNANCY: Status: ACTIVE | Noted: 2024-09-24

## 2024-09-24 PROBLEM — Z34.90 PREGNANCY: Status: ACTIVE | Noted: 2024-09-24

## 2024-09-24 PROBLEM — N83.209 CYST OF OVARY: Status: RESOLVED | Noted: 2021-03-04 | Resolved: 2024-09-24

## 2024-09-24 PROBLEM — K52.9 ACUTE GASTROENTERITIS: Status: RESOLVED | Noted: 2018-06-09 | Resolved: 2024-09-24

## 2024-09-24 PROBLEM — F12.90 MARIJUANA USE: Status: RESOLVED | Noted: 2017-10-04 | Resolved: 2024-09-24

## 2024-09-24 PROCEDURE — 99214 OFFICE O/P EST MOD 30 MIN: CPT | Performed by: OBSTETRICS & GYNECOLOGY

## 2024-09-24 RX ORDER — AMOXICILLIN 250 MG
2 CAPSULE ORAL DAILY
Qty: 60 TABLET | Refills: 5 | Status: SHIPPED | OUTPATIENT
Start: 2024-09-24

## 2024-09-24 RX ORDER — DOXYLAMINE SUCCINATE AND PYRIDOXINE HYDROCHLORIDE 20; 20 MG/1; MG/1
1 TABLET, EXTENDED RELEASE ORAL EVERY 12 HOURS SCHEDULED
COMMUNITY
Start: 2024-08-29

## 2024-09-25 ENCOUNTER — TELEPHONE (OUTPATIENT)
Dept: OBSTETRICS AND GYNECOLOGY | Age: 29
End: 2024-09-25
Payer: COMMERCIAL

## 2024-09-25 PROBLEM — O99.330 TOBACCO USE DURING PREGNANCY, ANTEPARTUM: Status: ACTIVE | Noted: 2024-09-25

## 2024-10-01 ENCOUNTER — TELEPHONE (OUTPATIENT)
Dept: OBSTETRICS AND GYNECOLOGY | Age: 29
End: 2024-10-01
Payer: COMMERCIAL

## 2024-10-01 NOTE — TELEPHONE ENCOUNTER
PCP suggesting she go back on Cymbalta,pt asking if OK to take during pregnancy.Pt is  10 weeks 6/7

## 2024-10-01 NOTE — TELEPHONE ENCOUNTER
Caller: Mignon Vasques    Relationship to patient: Self    Best call back number: [phone]    Patient is needing: EST OB PATIENT OF DR. ANTUNEZ.   10 WEEKS 6 DAYS. PT PROVIDER IS SUGGESTING SHE GOES BACK ON HER CYMBALTA. PT CALLING TO MAKE SURE THAT WILL BE OKAY TO TAKE DURING PREGNANCY.

## 2024-10-04 LAB
Lab: NEGATIVE
Lab: NORMAL
NTRA ALPHA-THALASSEMIA: NEGATIVE
NTRA BETA-HEMOGLOBINOPATHIES: NEGATIVE
NTRA CANAVAN DISEASE: NEGATIVE
NTRA CYSTIC FIBROSIS: NEGATIVE
NTRA DUCHENNE/BECKER MUSCULAR DYSTROPHY: NEGATIVE
NTRA FAMILIAL DYSAUTONOMIA: NEGATIVE
NTRA FRAGILE X SYNDROME: NEGATIVE
NTRA GALACTOSEMIA: NEGATIVE
NTRA GAUCHER DISEASE: NEGATIVE
NTRA MEDIUM CHAIN ACYL-COA DEHYDROGENASE DEFICIENCY: NEGATIVE
NTRA POLYCYSTIC KIDNEY DISEASE, AUTOSOMAL RECESSIVE: NEGATIVE
NTRA SMITH-LEMLI-OPITZ SYNDROME: NEGATIVE
NTRA SPINAL MUSCULAR ATROPHY: NEGATIVE
NTRA TAY-SACHS DISEASE: NEGATIVE

## 2024-10-17 ENCOUNTER — TELEPHONE (OUTPATIENT)
Dept: OBSTETRICS AND GYNECOLOGY | Age: 29
End: 2024-10-17
Payer: COMMERCIAL

## 2024-10-17 DIAGNOSIS — O21.9 VOMITING OF PREGNANCY, UNSPECIFIED: ICD-10-CM

## 2024-10-17 RX ORDER — VALACYCLOVIR HYDROCHLORIDE 1 G/1
1000 TABLET, FILM COATED ORAL 2 TIMES DAILY
Qty: 10 TABLET | Refills: 0 | Status: SHIPPED | OUTPATIENT
Start: 2024-10-17 | End: 2024-10-22

## 2024-10-17 NOTE — TELEPHONE ENCOUNTER
Caller: Mignon Vasques    Relationship: Self    Best call back number: [phone]    What medication are you requesting: SOMETHING FOR COLD SORES IN MOUTH    What are your current symptoms: MULTIPLE COLD SORES IN MOUTH    How long have you been experiencing symptoms: 3 DAYS    Have you had these symptoms before:    [x] Yes  [] No    Have you been treated for these symptoms before:   [x] Yes  [] No NOT CURRENTLY    If a prescription is needed, what is your preferred pharmacy and phone number:  [facility] ON [address]    Additional notes:  PT ASKING FOR SOMETHING TO HELP HER CLEAR THE COLD SORES SHE IS HAVING - PLEASE CALL PT TO ADVISE

## 2024-10-21 RX ORDER — DOXYLAMINE SUCCINATE AND PYRIDOXINE HYDROCHLORIDE 20; 20 MG/1; MG/1
1 TABLET, EXTENDED RELEASE ORAL 2 TIMES DAILY
Qty: 60 TABLET | Refills: 0 | Status: SHIPPED | OUTPATIENT
Start: 2024-10-21

## 2024-10-29 ENCOUNTER — TELEPHONE (OUTPATIENT)
Dept: OBSTETRICS AND GYNECOLOGY | Age: 29
End: 2024-10-29

## 2024-11-06 ENCOUNTER — ROUTINE PRENATAL (OUTPATIENT)
Dept: OBSTETRICS AND GYNECOLOGY | Age: 29
End: 2024-11-06
Payer: COMMERCIAL

## 2024-11-06 VITALS — DIASTOLIC BLOOD PRESSURE: 70 MMHG | BODY MASS INDEX: 34.73 KG/M2 | SYSTOLIC BLOOD PRESSURE: 118 MMHG | WEIGHT: 196 LBS

## 2024-11-06 DIAGNOSIS — Z3A.16 16 WEEKS GESTATION OF PREGNANCY: Primary | ICD-10-CM

## 2024-11-06 DIAGNOSIS — F19.11 HISTORY OF SUBSTANCE ABUSE: ICD-10-CM

## 2024-11-06 DIAGNOSIS — O09.899 MATERNAL VARICELLA, NON-IMMUNE: ICD-10-CM

## 2024-11-06 DIAGNOSIS — Z13.89 SCREENING FOR BLOOD OR PROTEIN IN URINE: ICD-10-CM

## 2024-11-06 DIAGNOSIS — O99.322 BUPRENORPHINE MAINTENANCE TREATMENT AFFECTING PREGNANCY IN SECOND TRIMESTER: ICD-10-CM

## 2024-11-06 DIAGNOSIS — Z28.39 MATERNAL VARICELLA, NON-IMMUNE: ICD-10-CM

## 2024-11-06 DIAGNOSIS — K59.00 CONSTIPATION, UNSPECIFIED CONSTIPATION TYPE: ICD-10-CM

## 2024-11-06 DIAGNOSIS — M54.16 LUMBAR RADICULOPATHY: ICD-10-CM

## 2024-11-06 DIAGNOSIS — F11.20 BUPRENORPHINE MAINTENANCE TREATMENT AFFECTING PREGNANCY IN SECOND TRIMESTER: ICD-10-CM

## 2024-11-06 LAB
GLUCOSE UR STRIP-MCNC: NEGATIVE MG/DL
PROT UR STRIP-MCNC: NEGATIVE MG/DL

## 2024-11-06 RX ORDER — DOCUSATE SODIUM 100 MG/1
100 CAPSULE, LIQUID FILLED ORAL 2 TIMES DAILY
Qty: 60 CAPSULE | Refills: 1 | Status: SHIPPED | OUTPATIENT
Start: 2024-11-06

## 2024-11-06 NOTE — PROGRESS NOTES
Chief Complaint   Patient presents with    Routine Prenatal Visit     Ob check- 16w0d c/o nausea without vomiting & constipation, declines flu shot       HPI: 29 y.o.  at 16w0d presents for OB follow-up.  Patient of Dr. Momin  She is doing well today.   She reports that the Brixadi injection once a month makes very fatigued for 3 days. Was wondering if suboxone tablets twice daily would be okay or what would be best for baby.  She saw her back pain specialist yesterday, the NP is sending a note to the MD there to see if they advise  or vaginal delivery and should be getting back to her soon.  Bonjesta helps nausea. No vomiting.  Prenatal vitamins make nauseous, was wondering if she could try prenatal gummies.   C/o constipation. Has not tried stool softeners.  No vaginal bleeding, loss of fluids, or contractions.   Reports feeling some fetal movements.       Last OB US Data (since 2024)       None          Vitals:    24 1219   BP: 118/70   Weight: 88.9 kg (196 lb)     Total weight gain for pregnancy:  0.907 kg (2 lb)    Review of systems:   Gen: negative  CV:     negative  GI: negative  :   negative  MS:    negative  Neuro: negative  Pul: negative    Physical Exam  Constitutional:       General: She is not in acute distress.  Cardiovascular:      Rate and Rhythm: Normal rate and regular rhythm.   Pulmonary:      Effort: Pulmonary effort is normal.      Breath sounds: Normal breath sounds.   Abdominal:      Palpations: Abdomen is soft.      Tenderness: There is no abdominal tenderness.   Skin:     General: Skin is warm.   Neurological:      Mental Status: She is alert and oriented to person, place, and time.   Psychiatric:         Mood and Affect: Mood normal.         Behavior: Behavior normal.         Thought Content: Thought content normal.         Judgment: Judgment normal.         A/P  1. Intrauterine pregnancy at 16w0d   2. Pregnancy Risk:  HIGH RISK    Diagnoses and all orders for  this visit:    1. 16 weeks gestation of pregnancy (Primary)  -     Alpha Fetoprotein, Maternal    2. Screening for blood or protein in urine  -     POC Urinalysis Dipstick    3. Maternal varicella, non-immune  Overview:  Varicella Zoster Antibody, IgG (08/29/2024 14:33)       4. Buprenorphine maintenance treatment affecting pregnancy in second trimester    5. Lumbar radiculopathy    6. History of substance abuse    7. Constipation, unspecified constipation type  -     docusate sodium (Colace) 100 MG capsule; Take 1 capsule by mouth 2 (Two) Times a Day.  Dispense: 60 capsule; Refill: 1        -----------------------  PLAN:   -AFP lab completed today. Will call patient with results and treat accordingly.  -Constipation: colace sent to pharmacy for treatment.  Advise increasing water intake as well.  -Informed patient I will send today's note to Dr. Momin to review if she can switch from Brixadi injection to suboxone. Advised continuing with Brixadi for now.   -Advised patient to try prenatal gummies and see if she tolerates those with her nausea.   -SAB warnings reviewed.   Return for Next scheduled follow up. - in 3 weeks on 11/26 for OB f/u and anatomy scan with Dr. Merrill Beaulieu PA-C  11/6/2024 16:27 EST

## 2024-11-08 ENCOUNTER — TELEPHONE (OUTPATIENT)
Dept: OBSTETRICS AND GYNECOLOGY | Age: 29
End: 2024-11-08
Payer: COMMERCIAL

## 2024-11-08 LAB
AFP INTERP SERPL-IMP: NORMAL
AFP INTERP SERPL-IMP: NORMAL
AFP MOM SERPL: 0.48
AFP SERPL-MCNC: 13.5 NG/ML
AGE AT DELIVERY: 28.5 YR
GA METHOD: NORMAL
GA: 16 WEEKS
IDDM PATIENT QL: NO
LABORATORY COMMENT REPORT: NORMAL
MULTIPLE PREGNANCY: NO
NEURAL TUBE DEFECT RISK FETUS: NORMAL %
RESULT: NORMAL

## 2024-11-08 NOTE — TELEPHONE ENCOUNTER
----- Message from Pema LIMA sent at 11/8/2024  2:50 PM EST -----  Pt notified but c/o swollen feet & falling asleep easily

## 2024-11-08 NOTE — TELEPHONE ENCOUNTER
Called patient, voicemail was full and unable to leave voicemail. Patient told MA c/o swollen feet and falling asleep easily. Will return call on Monday and schedule f/u soon. If voicemail was not full, was going to inform pt to go to ER for any emergencies over the weekend.     Heather Beaulieu PA-C

## 2024-11-12 ENCOUNTER — ROUTINE PRENATAL (OUTPATIENT)
Dept: OBSTETRICS AND GYNECOLOGY | Age: 29
End: 2024-11-12
Payer: COMMERCIAL

## 2024-11-12 VITALS — DIASTOLIC BLOOD PRESSURE: 68 MMHG | WEIGHT: 196 LBS | BODY MASS INDEX: 34.73 KG/M2 | SYSTOLIC BLOOD PRESSURE: 118 MMHG

## 2024-11-12 DIAGNOSIS — Z28.39 MATERNAL VARICELLA, NON-IMMUNE: ICD-10-CM

## 2024-11-12 DIAGNOSIS — O99.322 BUPRENORPHINE MAINTENANCE TREATMENT AFFECTING PREGNANCY IN SECOND TRIMESTER: ICD-10-CM

## 2024-11-12 DIAGNOSIS — Z30.2 REQUEST FOR STERILIZATION: ICD-10-CM

## 2024-11-12 DIAGNOSIS — O99.330 TOBACCO USE DURING PREGNANCY, ANTEPARTUM: ICD-10-CM

## 2024-11-12 DIAGNOSIS — O99.612 CONSTIPATION DURING PREGNANCY IN SECOND TRIMESTER: ICD-10-CM

## 2024-11-12 DIAGNOSIS — Z34.82 PRENATAL CARE, SUBSEQUENT PREGNANCY IN SECOND TRIMESTER: Primary | ICD-10-CM

## 2024-11-12 DIAGNOSIS — F11.20 BUPRENORPHINE MAINTENANCE TREATMENT AFFECTING PREGNANCY IN SECOND TRIMESTER: ICD-10-CM

## 2024-11-12 DIAGNOSIS — Z13.89 SCREENING FOR BLOOD OR PROTEIN IN URINE: ICD-10-CM

## 2024-11-12 DIAGNOSIS — O09.899 MATERNAL VARICELLA, NON-IMMUNE: ICD-10-CM

## 2024-11-12 DIAGNOSIS — K59.00 CONSTIPATION DURING PREGNANCY IN SECOND TRIMESTER: ICD-10-CM

## 2024-11-12 LAB
BILIRUB BLD-MCNC: NEGATIVE MG/DL
GLUCOSE UR STRIP-MCNC: NEGATIVE MG/DL
KETONES UR QL: ABNORMAL
LEUKOCYTE EST, POC: NEGATIVE
NITRITE UR-MCNC: NEGATIVE MG/ML
PH UR: 6.5 [PH] (ref 5–8)
PROT UR STRIP-MCNC: NEGATIVE MG/DL
RBC # UR STRIP: NEGATIVE /UL
SP GR UR: 1.02 (ref 1–1.03)
UROBILINOGEN UR QL: NORMAL

## 2024-11-12 NOTE — PROGRESS NOTES
Chief Complaint   Patient presents with    Routine Prenatal Visit     Cc: ob check , c/o swollen feet, falling asleep easily, migraines ,being lightheaded , desires tubal after pregnancy        HPI: 29 y.o.  at 16w6d presents for prenatal care     Last OB US Data (since 2024)       None          Vitals:    24 1535   BP: 118/68   Weight: 88.9 kg (196 lb)     Total weight gain for pregnancy:  0.907 kg (2 lb)    Review of systems:   Gen: fatigue  CV:     lower extremity edema  GI: negative  :   negative  MS:    negative  Neuro: headache  Pul: negative    Physical Exam  Vitals and nursing note reviewed.   Constitutional:       Appearance: Normal appearance.   Neurological:      Mental Status: She is alert and oriented to person, place, and time.   Psychiatric:         Mood and Affect: Mood normal.         Behavior: Behavior normal.           A/P  1. Intrauterine pregnancy at 16w6d   2. Pregnancy Risk:  HIGH RISK    Diagnoses and all orders for this visit:    1. Prenatal care, subsequent pregnancy in second trimester (Primary)    2. Screening for blood or protein in urine  -     POC Urinalysis Dipstick    3. Buprenorphine maintenance treatment affecting pregnancy in second trimester    4. Maternal varicella, non-immune  Overview:  Varicella Zoster Antibody, IgG (2024 14:33)       5. Constipation during pregnancy in second trimester    6. Tobacco use during pregnancy, antepartum    7. Request for sterilization        Nutrition and weight gain were addressed.  -----------------------  PLAN:   Return in about 2 weeks (around 2024), or ob check and anatomy US.  Advised increase water intake  Advised patient to contact her neurosurgeon regarding vaginal delivery  Advised compression stockings for swelling    Caroline Momin MD  2024 15:53 EST

## 2024-12-05 ENCOUNTER — ROUTINE PRENATAL (OUTPATIENT)
Dept: OBSTETRICS AND GYNECOLOGY | Age: 29
End: 2024-12-05
Payer: COMMERCIAL

## 2024-12-05 VITALS — BODY MASS INDEX: 34.73 KG/M2 | WEIGHT: 196 LBS | SYSTOLIC BLOOD PRESSURE: 128 MMHG | DIASTOLIC BLOOD PRESSURE: 72 MMHG

## 2024-12-05 DIAGNOSIS — Z3A.20 20 WEEKS GESTATION OF PREGNANCY: Primary | ICD-10-CM

## 2024-12-05 DIAGNOSIS — O28.3 ABNORMAL FETAL ULTRASOUND: ICD-10-CM

## 2024-12-05 DIAGNOSIS — F11.20 BUPRENORPHINE MAINTENANCE TREATMENT AFFECTING PREGNANCY IN SECOND TRIMESTER: ICD-10-CM

## 2024-12-05 DIAGNOSIS — O99.322 BUPRENORPHINE MAINTENANCE TREATMENT AFFECTING PREGNANCY IN SECOND TRIMESTER: ICD-10-CM

## 2024-12-05 DIAGNOSIS — O99.330 TOBACCO USE DURING PREGNANCY, ANTEPARTUM: ICD-10-CM

## 2024-12-05 DIAGNOSIS — O09.899 MATERNAL VARICELLA, NON-IMMUNE: ICD-10-CM

## 2024-12-05 DIAGNOSIS — Z28.39 MATERNAL VARICELLA, NON-IMMUNE: ICD-10-CM

## 2024-12-05 DIAGNOSIS — Z13.89 SCREENING FOR BLOOD OR PROTEIN IN URINE: ICD-10-CM

## 2024-12-05 NOTE — PROGRESS NOTES
Chief Complaint   Patient presents with    Routine Prenatal Visit     Ob check & anatomy u/s- 20w1d feeling well, declined flu shot       HPI: 29 y.o.  at 20w1d presents for OB follow-up.  Patient of Dr. Momin  She is doing well today.   C/o of some pain on her left lung, hurts sometimes when breathing and when waking up. No cough or fevers.  Patient concerned she has pneumonia and wants further testing.  Denies loss of fluid, vaginal bleeding, and contractions.    Endorses fetal movements.  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  Normal cervical length 5.96 cm, anterior placenta, no previa, vertex fetal position, DVP 4.93 cm.  Patient drank multiple water bottles and was unable to leave a urine sample still.    Relevant data reviewed:  US Ob 14 + Weeks Single or First Gestation (2024 13:36)     Last OB US Data (since 2024)         Value Time User    Placenta location  Anterior 2024  1:52 PM Heather Beaulieu PA-C          Vitals:    24 1333   BP: 128/72   Weight: 88.9 kg (196 lb)     Total weight gain for pregnancy:  0.907 kg (2 lb)    Review of systems:   Gen: negative  CV:     negative  GI: negative  :   negative and good fetal movement noted   MS:    negative  Neuro: negative  Pul: negative    Physical Exam  Constitutional:       General: She is not in acute distress.  Cardiovascular:      Rate and Rhythm: Normal rate and regular rhythm.   Pulmonary:      Effort: Pulmonary effort is normal.      Breath sounds: Normal breath sounds.   Abdominal:      Palpations: Abdomen is soft.      Tenderness: There is no abdominal tenderness.   Skin:     General: Skin is warm.   Neurological:      Mental Status: She is alert and oriented to person, place, and time.   Psychiatric:         Mood and Affect: Mood normal.         Behavior: Behavior normal.         Thought Content: Thought content normal.          Judgment: Judgment normal.         A/P  1. Intrauterine pregnancy at 20w1d   2. Pregnancy Risk:  HIGH RISK    Diagnoses and all orders for this visit:    1. 20 weeks gestation of pregnancy (Primary)    2. Screening for blood or protein in urine  -     Cancel: POC Urinalysis Dipstick    3. Abnormal fetal ultrasound  -     Ambulatory Referral to M/Perinatology    4. Buprenorphine maintenance treatment affecting pregnancy in second trimester    5. Tobacco use during pregnancy, antepartum    6. Maternal varicella, non-immune  Overview:  Varicella Zoster Antibody, IgG (2024 14:33)           Pre-eclampsia symptoms were discussed and warnings were given.   labor was discussed.  Warnings were provided.  Nutrition and weight gain were addressed.  -----------------------  PLAN:   -Reviewed anatomy scan results with patient: Anatomy scan today is complete but appears to have bilateral clubfeet.  Discussed that she will need to see M for further imaging and diagnosis, MFM referral placed.  -Patient drank multiple water bottles and was unable to leave a urine sample still.  -Informed patient she likely does not have pneumonia due to no cough or fever.  Will discuss with colleagues if any further treatment or workup is necessary for lung pain.  Return for Next scheduled follow up *may need to sign tubal papers*. - in 3 weeks on 24 with Dr. Momin for OB f/u    Heather Beaulieu PA-C  2024 17:34 EST

## 2024-12-24 ENCOUNTER — TELEPHONE (OUTPATIENT)
Dept: OBSTETRICS AND GYNECOLOGY | Age: 29
End: 2024-12-24

## 2024-12-24 NOTE — TELEPHONE ENCOUNTER
Called PT to reschedule missed ob appt on 12/24/24 at 9:00. No answer, unable to leave voicemail because voicemail box is full.

## 2024-12-26 ENCOUNTER — TRANSCRIBE ORDERS (OUTPATIENT)
Dept: ULTRASOUND IMAGING | Facility: HOSPITAL | Age: 29
End: 2024-12-26
Payer: COMMERCIAL

## 2024-12-26 DIAGNOSIS — R93.5 ABDOMINAL ULTRASOUND, ABNORMAL: Primary | ICD-10-CM

## 2025-01-07 ENCOUNTER — HOSPITAL ENCOUNTER (OUTPATIENT)
Dept: ULTRASOUND IMAGING | Facility: HOSPITAL | Age: 30
Discharge: HOME | End: 2025-01-07
Admitting: OBSTETRICS & GYNECOLOGY
Payer: COMMERCIAL

## 2025-01-07 ENCOUNTER — OFFICE VISIT (OUTPATIENT)
Dept: OBSTETRICS AND GYNECOLOGY | Facility: CLINIC | Age: 30
End: 2025-01-07
Payer: COMMERCIAL

## 2025-01-07 VITALS
WEIGHT: 197 LBS | BODY MASS INDEX: 34.91 KG/M2 | TEMPERATURE: 98 F | DIASTOLIC BLOOD PRESSURE: 58 MMHG | HEART RATE: 85 BPM | SYSTOLIC BLOOD PRESSURE: 115 MMHG | HEIGHT: 63 IN

## 2025-01-07 DIAGNOSIS — F11.20 BUPRENORPHINE MAINTENANCE TREATMENT AFFECTING PREGNANCY IN SECOND TRIMESTER: ICD-10-CM

## 2025-01-07 DIAGNOSIS — O99.330 TOBACCO USE DURING PREGNANCY, ANTEPARTUM: ICD-10-CM

## 2025-01-07 DIAGNOSIS — R93.5 ABDOMINAL ULTRASOUND, ABNORMAL: ICD-10-CM

## 2025-01-07 DIAGNOSIS — O28.3 ABNORMAL FETAL ULTRASOUND: ICD-10-CM

## 2025-01-07 DIAGNOSIS — Q66.89 CLUB FOOT, UNSPECIFIED LATERALITY: Primary | ICD-10-CM

## 2025-01-07 DIAGNOSIS — O99.322 BUPRENORPHINE MAINTENANCE TREATMENT AFFECTING PREGNANCY IN SECOND TRIMESTER: ICD-10-CM

## 2025-01-07 PROCEDURE — 76811 OB US DETAILED SNGL FETUS: CPT

## 2025-01-07 RX ORDER — FLUTICASONE PROPIONATE 50 MCG
1 SPRAY, SUSPENSION (ML) NASAL DAILY
COMMUNITY
Start: 2025-01-02

## 2025-01-07 NOTE — PROGRESS NOTES
MATERNAL FETAL MEDICINE Consult Note    Dear Dr Heather Beaulieu, P*:    Thank you for your kind referral of Mignon Vasques.  As you know, she is a 29 y.o.   24w6d gestation (Estimated Date of Delivery: 25). This is a consult.      Her antepartum course is complicated by:  Hx of opiate use--on suboxone  Tobacco use  Bilateral club feet (fetal)    Aneuploidy Screening: low risk male    HPI: Today, she denies headache, blurry vision, RUQ pain. No vaginal bleeding, no contractions.     Review of History:  Past Medical History:   Diagnosis Date    Abnormal Pap smear of cervix     Asthma     Back pain     Takes 1-2 Vicodin per day    Back pain     Slipped disc in L3 and L4    Cervical dysplasia     HPV (human papilloma virus) infection     Migraine      Past Surgical History:   Procedure Laterality Date    CHOLECYSTECTOMY      D & C WITH SUCTION      DENTAL PROCEDURE      LAPAROSCOPIC CHOLECYSTECTOMY      NECK SURGERY  2021       Social History     Socioeconomic History    Marital status:    Tobacco Use    Smoking status: Every Day     Current packs/day: 0.50     Average packs/day: 0.5 packs/day for 5.0 years (2.5 ttl pk-yrs)     Types: Cigarettes    Smokeless tobacco: Never   Vaping Use    Vaping status: Never Used   Substance and Sexual Activity    Alcohol use: No    Drug use: Not Currently     Types: Marijuana     Comment: vicodan for back pain     Sexual activity: Yes     Partners: Male     Birth control/protection: None     Family History   Problem Relation Age of Onset    Asthma Mother     Diabetes Mother     Hypertension Mother     Ovarian cancer Maternal Grandmother     Breast cancer Neg Hx     Uterine cancer Neg Hx     Colon cancer Neg Hx       Allergies   Allergen Reactions    Amoxicillin Hives, Diarrhea and Nausea And Vomiting    Naproxen Other (See Comments)    Tape Other (See Comments)    Adhesive Tape Rash    Benzethonium Chloride Rash      Current Outpatient Medications on File  "Prior to Visit   Medication Sig Dispense Refill    albuterol sulfate  (90 Base) MCG/ACT inhaler Inhale 2 puffs Every 4 (Four) Hours As Needed for Wheezing. 18 g 0    Bonjesta 20-20 MG tablet controlled-release tablet TAKE 1 TABLET BY MOUTH TWICE DAILY 60 tablet 0    Brixadi 128 MG/0.36ML solution prefilled syringe INJECT 128MG SUBCUTANEOUSLY ONCE MONTHLY      docusate sodium (Colace) 100 MG capsule Take 1 capsule by mouth 2 (Two) Times a Day. 60 capsule 1    gabapentin (NEURONTIN) 800 MG tablet Take 1 tablet by mouth 3 (Three) Times a Day.      pantoprazole (PROTONIX) 20 MG EC tablet Take 2 tablets by mouth Daily.      PRENATAL GUMMY VITAMIN Chew 1 each Daily. 30 each 11    fluticasone (FLONASE) 50 MCG/ACT nasal spray Administer 1 spray into the nostril(s) as directed by provider Daily. (Patient not taking: Reported on 1/7/2025)      sennosides-docusate (senna-docusate sodium) 8.6-50 MG per tablet Take 2 tablets by mouth Daily. 60 tablet 5     No current facility-administered medications on file prior to visit.        Past obstetric, gynecological, medical, surgical, family and social history reviewed.  Relevant lab work and imaging reviewed.    Review of systems  Constitutional:  denies fever, chills, malaise.   ENT/Mouth:  denies sore throat, tinnitus  Eyes: denies vision changes/pain  CV:  denies chest pain  Respiratory:  denies cough/SOB  GI:  denies N/V, diarrhea, abdominal pain.    :   denies dysuria  Skin:  denies lesions or pruritus   Neuro:  denies weakness, focal neurologic symptoms    Vitals:    01/07/25 1058   BP: 115/58   BP Location: Right arm   Patient Position: Sitting   Pulse: 85   Temp: 98 °F (36.7 °C)   TempSrc: Temporal   Weight: 89.4 kg (197 lb)   Height: 160 cm (63\")       PHYSICAL EXAM   GENERAL: Not in acute distress, AAOx3, pleasant  CARDIO: regular rate and rhythm  PULM: symmetric chest rise, speaking in complete sentences without difficulty  NEURO: awake, alert and oriented to " person, place, and time  ABDOMINAL: No fundal tenderness, no rebound or guarding, gravid  EXTREMITIES: no bilateral lower extremity edema/tenderness  SKIN: Warm, well-perfused      ULTRASOUND   Please view full ultrasound note on Imaging tab in ViewPoint.  Variable position with very active fetus.   Anterior placenta.  DUANE 20 cm, which is normal.    g (50%, AC 55%)  Normal appearing fetal anatomy except suspected R club foot.  Left foot looks normal on today's US.      ASSESSMENT/COUNSELIN y.o.   24w6d gestation (Estimated Date of Delivery: 25).       -Pregnancy  [ X ] stable  [   ] improving [  ] worsening    Diagnoses and all orders for this visit:    1. Club foot, unspecified laterality (Primary)    2. Tobacco use during pregnancy, antepartum    3. Buprenorphine maintenance treatment affecting pregnancy in second trimester         Clubfeet, suspect right  Today, a comprehensive anatomy ultrasound was performed. Fetal growth was appropriate. R foot appears clubbed and left appears normal. No other anomalies were visualized.     Clubfoot occurs in 1 in 1000 pregnancies. Most cases are isolated and idiopathic, but there are more than 250 non-chromosomal syndromes that include clubfeet. Chromosomal abnormalities associated with clubfoot include trisomy 18, and deletions of 18q, 4p, 9q, and 13q. Other etiologies include neuromuscular disorders. This condition should not change the course of her pregnancy and delivery should be per usual obstetric indications. The  will need a detailed physical exam at birth and post  consultation with a pediatric orthopedic surgeon. Recurrence risk in subsequent children if isolated with no genetic syndrome, is approximately 2%.     I reviewed with the patient the results of her NIPT which were low risk. We discussed definitive testing with amniocentesis. I explained the risk including 1 in 900 risk for pregnancy loss, PPROM, infection, bleeding.  We did discuss that even with a normal karyotype, there is still the possibility of non-chromosomal syndromes but that I have a low suspicion for this since club foot appears isolated.  She declines amniocentesis, which is reasonable--certainly genetic testing can be done after delivery if necessary.      We discussed the general considerations with regard to post  management of club foot.  Typically, the  will be scheduled to see a pediatric orthopaedic specialist at 2-3 weeks after birth.  Some cases are treated with serial casting followed by longer term shoe bracing.  Some cases require surgery, but many do not. I provided a referral today for the patient to have a prenatal consult at [facility] with an orthopaedic surgeon to discuss post  expectations and management planning in more detail.    Suboxone maintenance:  Tobacco use  This is a synthetic opioid that has been used for opiate addiction during pregnancy. This particular drug shares favorable qualities with methadone, such as decreased drug craving with daily dosing. This particular drug promotes patient autonomy and a broader availability for opiate maintenance. High abuse potential is definitely a concern with this particular medication. She reports that she was on opiates for back pain and transitioned to subutex as a way to hopefully get off eventually.       In a randomized controlled study buprenorphine produces a less severe  withdrawal (ROSAURA) than methadone, but ROSAURA is a risk for all opiate-exposed infants. We did discuss the importance of following the infant after delivery to watch for signs of withdrawal.  Certainly she is at a little increased risk of fetal growth restriction related to suboxone use.  We discussed that benefit outweighed the risk to keep her sober and that she should continue maintenance.       Interestingly, a recent study reported smoking cigarettes has been shown to be an important  contributor to  abstinence. This patient reports that she is trying to quit--she was encouraged to cut down as much as possible.       With regards to delivery, there is no need to increase pain medications or pain control after vaginal delivery in women on buprenorphine. Routine postpartum orders should be effective. After a  there may be more of a need for the first few days, generally about 50-70% more. The usage of full agonist or regional anesthesia is a good option for pain control, but avoidance of partial agonists such as stadol or nubain can throw women into withdrawal.      She is a very much desires postpartum subutex wean through her pain clinic and I think would be a good candidate for this after the immediate postpartum period.     Summary of Plan  -Serial growth ultrasounds every 4 weeks (MFM x 1 more, then OB)   -Referral to peds ortho  -Will add to fetal care list  -Discussed subutex wean postpartum--would not do during pregnancy  -Discussed smoking cessation  -Delivery 39 weeks unless indicated sooner    Follow-up: 1 month re-eval feet    Thank you for the consult and opportunity to care for this patient.  Please feel free to reach out with any questions or concerns.      I spent 35 minutes caring for this patient on this date of service. This time includes time spent by me in the following activities: preparing for the visit, reviewing tests, obtaining and/or reviewing a separately obtained history, performing a medically appropriate examination and/or evaluation, counseling and educating the patient/family/caregiver and independently interpreting results and communicating that information with the patient/family/caregiver with greater than 50% spent in counseling and coordination of care.       I spent 4 minutes on the separately reported service of US imaging not included in the time used to support the E/M service also reported today.      Bharti Rowe MD FACOG  Maternal  Fetal Medicine-[facility]  Office: [phone]  [email or web address]

## 2025-01-07 NOTE — LETTER
2025     Caroline Momin MD  [address]  [address]  [place] [address]    Patient: Mignon Vasques   YOB: 1995   Date of Visit: 2025       Dear Caroline Momin MD    Mignon Vasques was in my office today. Below is a copy of my note.    If you have questions, please do not hesitate to call me. I look forward to following Mignon along with you.         Sincerely,        Bharti Rowe MD      MATERNAL FETAL MEDICINE Consult Note    Dear MILES Fontanez*:    Thank you for your kind referral of Mignon Vasques.  As you know, she is a 29 y.o.   24w6d gestation (Estimated Date of Delivery: 25). This is a consult.      Her antepartum course is complicated by:  Hx of opiate use--on suboxone  Tobacco use  Bilateral club feet (fetal)    Aneuploidy Screening: low risk male    HPI: Today, she denies headache, blurry vision, RUQ pain. No vaginal bleeding, no contractions.     Review of History:  Past Medical History:   Diagnosis Date   • Abnormal Pap smear of cervix    • Asthma    • Back pain     Takes 1-2 Vicodin per day   • Back pain     Slipped disc in L3 and L4   • Cervical dysplasia    • HPV (human papilloma virus) infection    • Migraine      Past Surgical History:   Procedure Laterality Date   • CHOLECYSTECTOMY     • D & C WITH SUCTION     • DENTAL PROCEDURE     • LAPAROSCOPIC CHOLECYSTECTOMY     • NECK SURGERY  2021       Social History     Socioeconomic History   • Marital status:    Tobacco Use   • Smoking status: Every Day     Current packs/day: 0.50     Average packs/day: 0.5 packs/day for 5.0 years (2.5 ttl pk-yrs)     Types: Cigarettes   • Smokeless tobacco: Never   Vaping Use   • Vaping status: Never Used   Substance and Sexual Activity   • Alcohol use: No   • Drug use: Not Currently     Types: Marijuana     Comment: vicodan for back pain    • Sexual activity: Yes     Partners: Male     Birth control/protection: None     Family History    Problem Relation Age of Onset   • Asthma Mother    • Diabetes Mother    • Hypertension Mother    • Ovarian cancer Maternal Grandmother    • Breast cancer Neg Hx    • Uterine cancer Neg Hx    • Colon cancer Neg Hx       Allergies   Allergen Reactions   • Amoxicillin Hives, Diarrhea and Nausea And Vomiting   • Naproxen Other (See Comments)   • Tape Other (See Comments)   • Adhesive Tape Rash   • Benzethonium Chloride Rash      Current Outpatient Medications on File Prior to Visit   Medication Sig Dispense Refill   • albuterol sulfate  (90 Base) MCG/ACT inhaler Inhale 2 puffs Every 4 (Four) Hours As Needed for Wheezing. 18 g 0   • Bonjesta 20-20 MG tablet controlled-release tablet TAKE 1 TABLET BY MOUTH TWICE DAILY 60 tablet 0   • Brixadi 128 MG/0.36ML solution prefilled syringe INJECT 128MG SUBCUTANEOUSLY ONCE MONTHLY     • docusate sodium (Colace) 100 MG capsule Take 1 capsule by mouth 2 (Two) Times a Day. 60 capsule 1   • gabapentin (NEURONTIN) 800 MG tablet Take 1 tablet by mouth 3 (Three) Times a Day.     • pantoprazole (PROTONIX) 20 MG EC tablet Take 2 tablets by mouth Daily.     • PRENATAL GUMMY VITAMIN Chew 1 each Daily. 30 each 11   • fluticasone (FLONASE) 50 MCG/ACT nasal spray Administer 1 spray into the nostril(s) as directed by provider Daily. (Patient not taking: Reported on 1/7/2025)     • sennosides-docusate (senna-docusate sodium) 8.6-50 MG per tablet Take 2 tablets by mouth Daily. 60 tablet 5     No current facility-administered medications on file prior to visit.        Past obstetric, gynecological, medical, surgical, family and social history reviewed.  Relevant lab work and imaging reviewed.    Review of systems  Constitutional:  denies fever, chills, malaise.   ENT/Mouth:  denies sore throat, tinnitus  Eyes: denies vision changes/pain  CV:  denies chest pain  Respiratory:  denies cough/SOB  GI:  denies N/V, diarrhea, abdominal pain.    :   denies dysuria  Skin:  denies lesions or  "pruritus   Neuro:  denies weakness, focal neurologic symptoms    Vitals:    25 1058   BP: 115/58   BP Location: Right arm   Patient Position: Sitting   Pulse: 85   Temp: 98 °F (36.7 °C)   TempSrc: Temporal   Weight: 89.4 kg (197 lb)   Height: 160 cm (63\")       PHYSICAL EXAM   GENERAL: Not in acute distress, AAOx3, pleasant  CARDIO: regular rate and rhythm  PULM: symmetric chest rise, speaking in complete sentences without difficulty  NEURO: awake, alert and oriented to person, place, and time  ABDOMINAL: No fundal tenderness, no rebound or guarding, gravid  EXTREMITIES: no bilateral lower extremity edema/tenderness  SKIN: Warm, well-perfused      ULTRASOUND   Please view full ultrasound note on Imaging tab in ViewPoint.  Variable position with very active fetus.   Anterior placenta.  DUANE 20 cm, which is normal.    g (50%, AC 55%)  Normal appearing fetal anatomy except suspected R club foot.  Left foot looks normal on today's US.      ASSESSMENT/COUNSELIN y.o.   24w6d gestation (Estimated Date of Delivery: 25).       -Pregnancy  [ X ] stable  [   ] improving [  ] worsening    Diagnoses and all orders for this visit:    1. Club foot, unspecified laterality (Primary)    2. Tobacco use during pregnancy, antepartum    3. Buprenorphine maintenance treatment affecting pregnancy in second trimester         Clubfeet, suspect right  Today, a comprehensive anatomy ultrasound was performed. Fetal growth was appropriate. R foot appears clubbed and left appears normal. No other anomalies were visualized.     Clubfoot occurs in 1 in 1000 pregnancies. Most cases are isolated and idiopathic, but there are more than 250 non-chromosomal syndromes that include clubfeet. Chromosomal abnormalities associated with clubfoot include trisomy 18, and deletions of 18q, 4p, 9q, and 13q. Other etiologies include neuromuscular disorders. This condition should not change the course of her pregnancy and delivery " should be per usual obstetric indications. The  will need a detailed physical exam at birth and post  consultation with a pediatric orthopedic surgeon. Recurrence risk in subsequent children if isolated with no genetic syndrome, is approximately 2%.     I reviewed with the patient the results of her NIPT which were low risk. We discussed definitive testing with amniocentesis. I explained the risk including 1 in 900 risk for pregnancy loss, PPROM, infection, bleeding. We did discuss that even with a normal karyotype, there is still the possibility of non-chromosomal syndromes but that I have a low suspicion for this since club foot appears isolated.  She declines amniocentesis, which is reasonable--certainly genetic testing can be done after delivery if necessary.      We discussed the general considerations with regard to post  management of club foot.  Typically, the  will be scheduled to see a pediatric orthopaedic specialist at 2-3 weeks after birth.  Some cases are treated with serial casting followed by longer term shoe bracing.  Some cases require surgery, but many do not. I provided a referral today for the patient to have a prenatal consult at [facility] with an orthopaedic surgeon to discuss post  expectations and management planning in more detail.    Suboxone maintenance:  Tobacco use  This is a synthetic opioid that has been used for opiate addiction during pregnancy. This particular drug shares favorable qualities with methadone, such as decreased drug craving with daily dosing. This particular drug promotes patient autonomy and a broader availability for opiate maintenance. High abuse potential is definitely a concern with this particular medication. She reports that she was on opiates for back pain and transitioned to subutex as a way to hopefully get off eventually.       In a randomized controlled study buprenorphine produces a less severe  withdrawal  (ROSAURA) than methadone, but ROSAURA is a risk for all opiate-exposed infants. We did discuss the importance of following the infant after delivery to watch for signs of withdrawal.  Certainly she is at a little increased risk of fetal growth restriction related to suboxone use.  We discussed that benefit outweighed the risk to keep her sober and that she should continue maintenance.       Interestingly, a recent study reported smoking cigarettes has been shown to be an important contributor to  abstinence. This patient reports that she is trying to quit--she was encouraged to cut down as much as possible.       With regards to delivery, there is no need to increase pain medications or pain control after vaginal delivery in women on buprenorphine. Routine postpartum orders should be effective. After a  there may be more of a need for the first few days, generally about 50-70% more. The usage of full agonist or regional anesthesia is a good option for pain control, but avoidance of partial agonists such as stadol or nubain can throw women into withdrawal.      She is a very much desires postpartum subutex wean through her pain clinic and I think would be a good candidate for this after the immediate postpartum period.     Summary of Plan  -Serial growth ultrasounds every 4 weeks (MFM x 1 more, then OB)   -Referral to peds ortho  -Will add to fetal care list  -Discussed subutex wean postpartum--would not do during pregnancy  -Discussed smoking cessation  -Delivery 39 weeks unless indicated sooner    Follow-up: 1 month re-eval feet    Thank you for the consult and opportunity to care for this patient.  Please feel free to reach out with any questions or concerns.      I spent 35 minutes caring for this patient on this date of service. This time includes time spent by me in the following activities: preparing for the visit, reviewing tests, obtaining and/or reviewing a separately obtained history, performing a  medically appropriate examination and/or evaluation, counseling and educating the patient/family/caregiver and independently interpreting results and communicating that information with the patient/family/caregiver with greater than 50% spent in counseling and coordination of care.       I spent 4 minutes on the separately reported service of US imaging not included in the time used to support the E/M service also reported today.      Bharti Rowe MD [facility]  Maternal Fetal Medicine-[facility]  Office: [phone]  [email or web address]

## 2025-01-07 NOTE — PROGRESS NOTES
Pt reports that she is doing well and denies vaginal bleeding, cramping, contractions or LOF at this time. Reports active fetal movement. Reviewed when to call OB office or present to L&D for evaluation with symptoms such as decreased fetal movement, vaginal bleeding, LOF or ctxs. Pt verbalized understanding. Denies HA, visual changes or epigastric pain. Denies any additional complaints at time of appointment. Next OB appointment scheduled for 1/21.    Vitals:    01/07/25 1058   BP: 115/58   Pulse: 85   Temp: 98 °F (36.7 °C)

## 2025-01-20 ENCOUNTER — TRANSCRIBE ORDERS (OUTPATIENT)
Dept: ULTRASOUND IMAGING | Facility: HOSPITAL | Age: 30
End: 2025-01-20
Payer: COMMERCIAL

## 2025-01-20 DIAGNOSIS — O28.3 ABNORMAL FETAL ULTRASOUND: Primary | ICD-10-CM

## 2025-01-21 ENCOUNTER — ROUTINE PRENATAL (OUTPATIENT)
Dept: OBSTETRICS AND GYNECOLOGY | Age: 30
End: 2025-01-21
Payer: COMMERCIAL

## 2025-01-21 VITALS — DIASTOLIC BLOOD PRESSURE: 66 MMHG | WEIGHT: 198 LBS | SYSTOLIC BLOOD PRESSURE: 120 MMHG | BODY MASS INDEX: 35.07 KG/M2

## 2025-01-21 DIAGNOSIS — Z3A.26 26 WEEKS GESTATION OF PREGNANCY: ICD-10-CM

## 2025-01-21 DIAGNOSIS — O99.330 TOBACCO USE DURING PREGNANCY, ANTEPARTUM: ICD-10-CM

## 2025-01-21 DIAGNOSIS — Z30.2 REQUEST FOR STERILIZATION: ICD-10-CM

## 2025-01-21 DIAGNOSIS — Z13.0 SCREENING FOR IRON DEFICIENCY ANEMIA: ICD-10-CM

## 2025-01-21 DIAGNOSIS — Z13.89 SCREENING FOR BLOOD OR PROTEIN IN URINE: ICD-10-CM

## 2025-01-21 DIAGNOSIS — K59.00 CONSTIPATION DURING PREGNANCY IN SECOND TRIMESTER: ICD-10-CM

## 2025-01-21 DIAGNOSIS — Z34.82 PRENATAL CARE, SUBSEQUENT PREGNANCY IN SECOND TRIMESTER: Primary | ICD-10-CM

## 2025-01-21 DIAGNOSIS — O99.322 BUPRENORPHINE MAINTENANCE TREATMENT AFFECTING PREGNANCY IN SECOND TRIMESTER: ICD-10-CM

## 2025-01-21 DIAGNOSIS — Z28.39 MATERNAL VARICELLA, NON-IMMUNE: ICD-10-CM

## 2025-01-21 DIAGNOSIS — O99.612 CONSTIPATION DURING PREGNANCY IN SECOND TRIMESTER: ICD-10-CM

## 2025-01-21 DIAGNOSIS — F11.20 BUPRENORPHINE MAINTENANCE TREATMENT AFFECTING PREGNANCY IN SECOND TRIMESTER: ICD-10-CM

## 2025-01-21 DIAGNOSIS — Z13.1 SCREENING FOR DIABETES MELLITUS: ICD-10-CM

## 2025-01-21 DIAGNOSIS — Z23 ENCOUNTER FOR ADMINISTRATION OF VACCINE: ICD-10-CM

## 2025-01-21 DIAGNOSIS — O09.899 MATERNAL VARICELLA, NON-IMMUNE: ICD-10-CM

## 2025-01-21 PROBLEM — F17.200 SMOKER: Status: RESOLVED | Noted: 2017-10-03 | Resolved: 2025-01-21

## 2025-01-21 PROBLEM — O99.519 ASTHMA DURING PREGNANCY: Status: ACTIVE | Noted: 2021-03-04

## 2025-01-21 NOTE — PROGRESS NOTES
Chief Complaint   Patient presents with    Routine Prenatal Visit     Cc:  ob check with 1 hr Gtt @ 1039 AM and tdap today , no ob complaints today , patient did not leave a urine sample        HPI: 29 y.o.  at 26w6d presents for prenatal care    Last OB US Data (since 2024)         Value Time User    Placenta location  Anterior 2024 12:23 PM Caroline Momin MD          Vitals:    25 0943   BP: 120/66   Weight: 89.8 kg (198 lb)     Total weight gain for pregnancy:  1.814 kg (4 lb)    Review of systems:   Gen: negative  CV:     negative  GI: negative  :   negative and good fetal movement noted   MS:    negative  Neuro: negative  Pul: negative    Physical Exam  Vitals and nursing note reviewed.   Constitutional:       Appearance: Normal appearance. She is obese.   Pulmonary:      Effort: Pulmonary effort is normal.   Neurological:      Mental Status: She is alert.   Psychiatric:         Mood and Affect: Mood normal.         Thought Content: Thought content normal.         Judgment: Judgment normal.           A/P  1. Intrauterine pregnancy at 26w6d   2. Pregnancy Risk:  HIGH RISK    Diagnoses and all orders for this visit:    1. Prenatal care, subsequent pregnancy in second trimester (Primary)    2. Screening for iron deficiency anemia  -     Gestational Screen 1 Hr ([facility])  -     Cancel: POC Urinalysis Dipstick  -     CBC (No Diff)  -     Treponema pallidum AB w/Reflex RPR    3. Screening for diabetes mellitus  -     Gestational Screen 1 Hr ([facility])  -     Cancel: POC Urinalysis Dipstick  -     CBC (No Diff)  -     Treponema pallidum AB w/Reflex RPR    4. 26 weeks gestation of pregnancy  -     Gestational Screen 1 Hr ([facility])  -     Cancel: POC Urinalysis Dipstick  -     CBC (No Diff)  -     Treponema pallidum AB w/Reflex RPR    5. Encounter for administration of vaccine  -     Gestational Screen 1 Hr ([facility])  -     Cancel: POC Urinalysis Dipstick  -     CBC (No Diff)  -      Treponema pallidum AB w/Reflex RPR    6. Screening for blood or protein in urine  -     Gestational Screen 1 Hr ([facility])  -     Cancel: POC Urinalysis Dipstick  -     CBC (No Diff)  -     Treponema pallidum AB w/Reflex RPR    7. Request for sterilization    8. Tobacco use during pregnancy, antepartum    9. Maternal varicella, non-immune  Overview:  Varicella Zoster Antibody, IgG (2024 14:33)       10. Constipation during pregnancy in second trimester    11. Buprenorphine maintenance treatment affecting pregnancy in second trimester    Other orders  -     Tdap Vaccine Greater Than or Equal To 6yo IM         labor was discussed.  Warnings were provided.  Nutrition and weight gain were addressed.  -----------------------  PLAN:   Return in about 3 weeks (around 2025), or ob check.  Sign tubal papers today     Caroline Momin MD  2025 10:30 EST

## 2025-01-23 DIAGNOSIS — O99.019 MATERNAL ANEMIA IN PREGNANCY, ANTEPARTUM: Primary | ICD-10-CM

## 2025-01-23 LAB
ERYTHROCYTE [DISTWIDTH] IN BLOOD BY AUTOMATED COUNT: 12.4 % (ref 11.7–15.4)
GLUCOSE 1H P 50 G GLC PO SERPL-MCNC: 99 MG/DL (ref 70–139)
HCT VFR BLD AUTO: 32.9 % (ref 34–46.6)
HGB BLD-MCNC: 10.8 G/DL (ref 11.1–15.9)
MCH RBC QN AUTO: 31.5 PG (ref 26.6–33)
MCHC RBC AUTO-ENTMCNC: 32.8 G/DL (ref 31.5–35.7)
MCV RBC AUTO: 96 FL (ref 79–97)
PLATELET # BLD AUTO: 199 X10E3/UL (ref 150–450)
RBC # BLD AUTO: 3.43 X10E6/UL (ref 3.77–5.28)
TREPONEMA PALLIDUM IGG+IGM AB [PRESENCE] IN SERUM OR PLASMA BY IMMUNOASSAY: NON REACTIVE
WBC # BLD AUTO: 8.3 X10E3/UL (ref 3.4–10.8)

## 2025-01-23 RX ORDER — FERROUS SULFATE 325(65) MG
325 TABLET ORAL
Qty: 90 TABLET | Refills: 1 | Status: SHIPPED | OUTPATIENT
Start: 2025-01-23

## 2025-01-23 NOTE — PROGRESS NOTES
Call patient and notify of normal results of one-hour gtt but anemic - Iron supplement sent to pharmacy

## 2025-01-31 ENCOUNTER — TELEPHONE (OUTPATIENT)
Dept: OBSTETRICS AND GYNECOLOGY | Age: 30
End: 2025-01-31
Payer: COMMERCIAL

## 2025-01-31 NOTE — TELEPHONE ENCOUNTER
Patient mother is calling because the patient leg calf and foot is swollen. It's the leg that she ruptured a disc and her leg and toes are numb constantly and she never gotten the issue taken care of. Issue has been going on for a few days. Mother wanted to let Dr. Momin know. 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 Mother states it's severely swollen as of right now. Mother would like a call back if possible. [phone].

## 2025-01-31 NOTE — TELEPHONE ENCOUNTER
Please notify patient that I recommend evaluation in the ER to rule out a DVT.  Please let me know if any questions.

## 2025-01-31 NOTE — TELEPHONE ENCOUNTER
Pt was notified and voiced understanding.   77 yo M, no pertinent PMHx, no allergies, no home medications BIBEMS c/o SOB, wheezing, difficulty breathing in the setting of flu-like symptoms x 5 days. Pt c/o cough, body aches. Per wife pt is not on anti-coagulants, beta-blockers, anti-hypertensive drugs. Pt smokes marijuana regularly. Pt states he has not seen a PMD in years. Pt's wife admits that he used a vaporizer for marijuana once recently, but has been using a vicks vaporizer since onset of symptoms.

## 2025-02-04 ENCOUNTER — TELEPHONE (OUTPATIENT)
Dept: ULTRASOUND IMAGING | Facility: HOSPITAL | Age: 30
End: 2025-02-04

## 2025-02-04 NOTE — TELEPHONE ENCOUNTER
ATTEMPTED TO CONTACT PT TO [facility] MISSED APPT FROM TODAY BUT MAILBOX IS FULL AND WILL NOT ALLOW NEW MESSAGES AT THIS TIME.

## 2025-02-24 ENCOUNTER — TELEPHONE (OUTPATIENT)
Dept: OBSTETRICS AND GYNECOLOGY | Facility: CLINIC | Age: 30
End: 2025-02-24

## 2025-02-24 NOTE — TELEPHONE ENCOUNTER
Attempt to call Mignon twice regarding rescheduled appointment with peds ortho. Pt no showed 2/20 appointment and is rescheduled for 2/28 at 1:45pm. Mailbox full, unable to leave voicemail. MyChart not set up to send message either.

## 2025-02-25 ENCOUNTER — TELEPHONE (OUTPATIENT)
Dept: OBSTETRICS AND GYNECOLOGY | Age: 30
End: 2025-02-25

## 2025-02-25 NOTE — TELEPHONE ENCOUNTER
Called PT to r/s missed ob appt on  02/25/25 at 8:00. No answer, unable to leave message because voicemail is full.

## 2025-02-27 ENCOUNTER — DOCUMENTATION (OUTPATIENT)
Dept: NURSERY | Facility: HOSPITAL | Age: 30
End: 2025-02-27

## 2025-02-27 NOTE — PROGRESS NOTES
DATE: 2025  MRN: [id number]      Patient Name: Mignon Vasques  YOB: 1995    Dear Health Care Provider,    The patient listed above was discussed at the [facility].     The fetal diagnosis is:   Hx of opiate use--on suboxone  Bilateral club feet (fetal)     Aneuploidy Screening: low risk male    Recommendations:  Delivery is currently planned at [facility]    Plan is for the baby to be admitted to the [facility] if there are no additional concerns    After admission the plan is for Outpatient Follow Up with Pediatric Orthopedics    Please be aware that the plan may change if more information is obtained during the prenatal course.    Please feel free to call with any questions:    Maternal Fetal Medicine at [phone]  Pediatric Cardiology at [phone]  Neonatology at [phone]    Electronically signed by Tyrone Marin MD, 25, 1:27 PM EST.

## 2025-03-11 ENCOUNTER — TELEPHONE (OUTPATIENT)
Dept: OBSTETRICS AND GYNECOLOGY | Age: 30
End: 2025-03-11

## 2025-03-11 NOTE — TELEPHONE ENCOUNTER
Called pt to r/s missed appointment 03/11/25. No answer, unable to leave voicemail because mailbox is full.

## 2025-03-18 ENCOUNTER — ROUTINE PRENATAL (OUTPATIENT)
Dept: OBSTETRICS AND GYNECOLOGY | Age: 30
End: 2025-03-18
Payer: MEDICAID

## 2025-03-18 VITALS — DIASTOLIC BLOOD PRESSURE: 68 MMHG | SYSTOLIC BLOOD PRESSURE: 112 MMHG | WEIGHT: 201 LBS | BODY MASS INDEX: 35.61 KG/M2

## 2025-03-18 DIAGNOSIS — Z34.83 PRENATAL CARE, SUBSEQUENT PREGNANCY IN THIRD TRIMESTER: Primary | ICD-10-CM

## 2025-03-18 DIAGNOSIS — F11.20 BUPRENORPHINE MAINTENANCE TREATMENT AFFECTING PREGNANCY IN THIRD TRIMESTER: ICD-10-CM

## 2025-03-18 DIAGNOSIS — O99.019 MATERNAL ANEMIA IN PREGNANCY, ANTEPARTUM: ICD-10-CM

## 2025-03-18 DIAGNOSIS — O09.899 MATERNAL VARICELLA, NON-IMMUNE: ICD-10-CM

## 2025-03-18 DIAGNOSIS — Z30.2 REQUEST FOR STERILIZATION: ICD-10-CM

## 2025-03-18 DIAGNOSIS — O99.323 BUPRENORPHINE MAINTENANCE TREATMENT AFFECTING PREGNANCY IN THIRD TRIMESTER: ICD-10-CM

## 2025-03-18 DIAGNOSIS — M54.16 LUMBAR RADICULOPATHY: ICD-10-CM

## 2025-03-18 DIAGNOSIS — O99.330 TOBACCO USE DURING PREGNANCY, ANTEPARTUM: ICD-10-CM

## 2025-03-18 DIAGNOSIS — Z28.39 MATERNAL VARICELLA, NON-IMMUNE: ICD-10-CM

## 2025-03-18 DIAGNOSIS — Z13.89 SCREENING FOR BLOOD OR PROTEIN IN URINE: ICD-10-CM

## 2025-03-18 LAB
BILIRUB BLD-MCNC: NEGATIVE MG/DL
GLUCOSE UR STRIP-MCNC: NEGATIVE MG/DL
KETONES UR QL: NEGATIVE
LEUKOCYTE EST, POC: ABNORMAL
NITRITE UR-MCNC: NEGATIVE MG/ML
PH UR: 7 [PH] (ref 5–8)
PROT UR STRIP-MCNC: ABNORMAL MG/DL
RBC # UR STRIP: NEGATIVE /UL
SP GR UR: 1.02 (ref 1–1.03)
UROBILINOGEN UR QL: NORMAL

## 2025-03-18 NOTE — PROGRESS NOTES
Chief Complaint   Patient presents with    Routine Prenatal Visit     Cc: ob check with BPP and Growth Us today , no ob complaints , left leg swelling        HPI: 29 y.o.  at 34w6d presents for prenatal care    Last OB US Data (since 2024)         Value Time User    EFW%ILE  32%ile 3/18/2025  2:03 PM Caroline Momin MD    AC%ILE  49%ile 3/18/2025  2:03 PM Caroline Momin MD    Placenta location  Anterior 2024 12:23 PM Caroline Momin MD          Vitals:    25 1407   BP: 112/68   Weight: 91.2 kg (201 lb)     Total weight gain for pregnancy:  3.175 kg (7 lb)    Review of systems:   Gen: negative  CV:     negative  GI: negative  :   negative and good fetal movement noted   MS:    negative  Neuro: negative  Pul: negative    Physical Exam  Vitals and nursing note reviewed.   Constitutional:       Appearance: Normal appearance. She is obese.   Pulmonary:      Effort: Pulmonary effort is normal.   Neurological:      Mental Status: She is alert.   Psychiatric:         Mood and Affect: Mood normal.         Thought Content: Thought content normal.         Judgment: Judgment normal.           A/P  1. Intrauterine pregnancy at 34w6d   2. Pregnancy Risk:  HIGH RISK    Diagnoses and all orders for this visit:    1. Prenatal care, subsequent pregnancy in third trimester (Primary)    2. Screening for blood or protein in urine  -     POC Urinalysis Dipstick    3. Request for sterilization    4. Buprenorphine maintenance treatment affecting pregnancy in third trimester    5. Maternal varicella, non-immune  Overview:  Varicella Zoster Antibody, IgG (2024 14:33)       6. Maternal anemia in pregnancy, antepartum    7. Tobacco use during pregnancy, antepartum    8. Lumbar radiculopathy         labor was discussed.  Warnings were provided.  Nutrition and weight gain were addressed.  -----------------------  PLAN:   Return in about 1 week (around 3/25/2025), or ob check and bpp.  Normal  fetal growth today  BPP today 8/8     Caroline Momin MD  3/18/2025 16:45 EDT

## 2025-03-24 ENCOUNTER — TELEPHONE (OUTPATIENT)
Dept: OBSTETRICS AND GYNECOLOGY | Facility: CLINIC | Age: 30
End: 2025-03-24
Payer: COMMERCIAL

## 2025-03-24 NOTE — TELEPHONE ENCOUNTER
Call placed to Mignon to notify her of ortho consult rescheduled for Wednesday 4/2 at 8:30am. Address provided to patient. Pt verbalized understanding. No further questions at this time.

## 2025-03-25 ENCOUNTER — ROUTINE PRENATAL (OUTPATIENT)
Dept: OBSTETRICS AND GYNECOLOGY | Age: 30
End: 2025-03-25
Payer: COMMERCIAL

## 2025-03-25 VITALS — BODY MASS INDEX: 36.31 KG/M2 | SYSTOLIC BLOOD PRESSURE: 110 MMHG | DIASTOLIC BLOOD PRESSURE: 74 MMHG | WEIGHT: 205 LBS

## 2025-03-25 DIAGNOSIS — F19.11 HISTORY OF SUBSTANCE ABUSE: ICD-10-CM

## 2025-03-25 DIAGNOSIS — F11.20 BUPRENORPHINE MAINTENANCE TREATMENT AFFECTING PREGNANCY IN THIRD TRIMESTER: ICD-10-CM

## 2025-03-25 DIAGNOSIS — Z13.89 SCREENING FOR BLOOD OR PROTEIN IN URINE: Primary | ICD-10-CM

## 2025-03-25 DIAGNOSIS — K59.00 CONSTIPATION DURING PREGNANCY IN SECOND TRIMESTER: ICD-10-CM

## 2025-03-25 DIAGNOSIS — O99.323 BUPRENORPHINE MAINTENANCE TREATMENT AFFECTING PREGNANCY IN THIRD TRIMESTER: ICD-10-CM

## 2025-03-25 DIAGNOSIS — O99.519 ASTHMA DURING PREGNANCY: ICD-10-CM

## 2025-03-25 DIAGNOSIS — O99.330 TOBACCO USE DURING PREGNANCY, ANTEPARTUM: ICD-10-CM

## 2025-03-25 DIAGNOSIS — Z36.85 ANTENATAL SCREENING FOR STREPTOCOCCUS B: ICD-10-CM

## 2025-03-25 DIAGNOSIS — Z28.39 MATERNAL VARICELLA, NON-IMMUNE: ICD-10-CM

## 2025-03-25 DIAGNOSIS — O09.899 MATERNAL VARICELLA, NON-IMMUNE: ICD-10-CM

## 2025-03-25 DIAGNOSIS — Z34.83 PRENATAL CARE, SUBSEQUENT PREGNANCY IN THIRD TRIMESTER: ICD-10-CM

## 2025-03-25 DIAGNOSIS — Z30.2 REQUEST FOR STERILIZATION: ICD-10-CM

## 2025-03-25 DIAGNOSIS — J45.909 ASTHMA DURING PREGNANCY: ICD-10-CM

## 2025-03-25 DIAGNOSIS — O99.612 CONSTIPATION DURING PREGNANCY IN SECOND TRIMESTER: ICD-10-CM

## 2025-03-25 DIAGNOSIS — O99.019 MATERNAL ANEMIA IN PREGNANCY, ANTEPARTUM: ICD-10-CM

## 2025-03-25 NOTE — PROGRESS NOTES
Chief Complaint   Patient presents with    Routine Prenatal Visit     Cc: ob check with BPP U/S today , GBS today , reports good FM , still having swollen left food , back pain , nauseous .       HPI: 29 y.o.  at 35w6d presents for prenatal care      Last OB US Data (since 2024)         Value Time User    EFW%ILE  32%ile 3/18/2025  2:03 PM Caroline Momin MD    AC%ILE  49%ile 3/18/2025  2:03 PM Caroline Momin MD    Placenta location  Anterior 3/25/2025 11:28 AM Caroline Momin MD          Vitals:    25 1122   BP: 110/74   Weight: 93 kg (205 lb)     Total weight gain for pregnancy:  4.99 kg (11 lb)    Review of systems:   Gen: negative  CV:     negative  GI: negative  :   good fetal movement noted  and pelvic pressure  MS:    back pain   Neuro: negative  Pul: negative    Physical Exam  Vitals and nursing note reviewed.   Constitutional:       Appearance: Normal appearance.   Pulmonary:      Effort: Pulmonary effort is normal.   Neurological:      Mental Status: She is alert.   Psychiatric:         Mood and Affect: Mood normal.         Thought Content: Thought content normal.         Judgment: Judgment normal.           A/P  1. Intrauterine pregnancy at 35w6d   2. Pregnancy Risk:  HIGH RISK    Diagnoses and all orders for this visit:    1. Screening for blood or protein in urine (Primary)  -     POC Urinalysis Dipstick    2.  screening for streptococcus B  -     Strep Grp B MORAIMA + Reflex - Swab, Vaginal/Rectum    3. Request for sterilization    4. Asthma during pregnancy    5. Buprenorphine maintenance treatment affecting pregnancy in third trimester    6. Maternal anemia in pregnancy, antepartum    7. Maternal varicella, non-immune  Overview:  Varicella Zoster Antibody, IgG (2024 14:33)       8. Constipation during pregnancy in second trimester    9. History of substance abuse    10. Tobacco use during pregnancy, antepartum    11. Prenatal care, subsequent pregnancy in  third trimester        Pre-eclampsia symptoms were discussed and warnings were given.   labor was discussed.  Warnings were provided.  Nutrition and weight gain were addressed.  -----------------------  PLAN:   Return in about 1 week (around 2025), or ob check and BPP.  GBS collected   BPP    Caroline Momin MD  3/25/2025 11:37 EDT

## 2025-03-27 LAB — GP B STREP DNA SPEC QL NAA+PROBE: NEGATIVE

## 2025-04-01 ENCOUNTER — PREP FOR SURGERY (OUTPATIENT)
Dept: OTHER | Facility: HOSPITAL | Age: 30
End: 2025-04-01
Payer: COMMERCIAL

## 2025-04-01 ENCOUNTER — ROUTINE PRENATAL (OUTPATIENT)
Dept: OBSTETRICS AND GYNECOLOGY | Age: 30
End: 2025-04-01
Payer: COMMERCIAL

## 2025-04-01 VITALS — BODY MASS INDEX: 36.31 KG/M2 | SYSTOLIC BLOOD PRESSURE: 110 MMHG | WEIGHT: 205 LBS | DIASTOLIC BLOOD PRESSURE: 72 MMHG

## 2025-04-01 DIAGNOSIS — Z13.89 SCREENING FOR BLOOD OR PROTEIN IN URINE: ICD-10-CM

## 2025-04-01 DIAGNOSIS — O09.899 MATERNAL VARICELLA, NON-IMMUNE: ICD-10-CM

## 2025-04-01 DIAGNOSIS — O99.019 MATERNAL ANEMIA IN PREGNANCY, ANTEPARTUM: ICD-10-CM

## 2025-04-01 DIAGNOSIS — J45.909 ASTHMA DURING PREGNANCY: ICD-10-CM

## 2025-04-01 DIAGNOSIS — F19.11 HISTORY OF SUBSTANCE ABUSE: ICD-10-CM

## 2025-04-01 DIAGNOSIS — F11.20 BUPRENORPHINE MAINTENANCE TREATMENT AFFECTING PREGNANCY IN THIRD TRIMESTER: ICD-10-CM

## 2025-04-01 DIAGNOSIS — O35.HXX0 CLUB FOOT OF FETUS AFFECTING ANTEPARTUM CARE OF MOTHER, SINGLE OR UNSPECIFIED FETUS: ICD-10-CM

## 2025-04-01 DIAGNOSIS — O99.323 BUPRENORPHINE MAINTENANCE TREATMENT AFFECTING PREGNANCY IN THIRD TRIMESTER: ICD-10-CM

## 2025-04-01 DIAGNOSIS — O99.519 ASTHMA DURING PREGNANCY: ICD-10-CM

## 2025-04-01 DIAGNOSIS — Z34.83 PRENATAL CARE, SUBSEQUENT PREGNANCY IN THIRD TRIMESTER: Primary | ICD-10-CM

## 2025-04-01 DIAGNOSIS — Z28.39 MATERNAL VARICELLA, NON-IMMUNE: ICD-10-CM

## 2025-04-01 DIAGNOSIS — O99.330 TOBACCO USE DURING PREGNANCY, ANTEPARTUM: ICD-10-CM

## 2025-04-01 LAB
BILIRUB BLD-MCNC: NEGATIVE MG/DL
GLUCOSE UR STRIP-MCNC: NEGATIVE MG/DL
KETONES UR QL: NEGATIVE
LEUKOCYTE EST, POC: NEGATIVE
NITRITE UR-MCNC: NEGATIVE MG/ML
PH UR: 7.5 [PH] (ref 5–8)
PROT UR STRIP-MCNC: ABNORMAL MG/DL
RBC # UR STRIP: NEGATIVE /UL
SP GR UR: 1.02 (ref 1–1.03)
UROBILINOGEN UR QL: NORMAL

## 2025-04-01 RX ORDER — ACETAMINOPHEN 325 MG/1
650 TABLET ORAL EVERY 4 HOURS PRN
OUTPATIENT
Start: 2025-04-01

## 2025-04-01 RX ORDER — ONDANSETRON 4 MG/1
4 TABLET, ORALLY DISINTEGRATING ORAL EVERY 6 HOURS PRN
OUTPATIENT
Start: 2025-04-01

## 2025-04-01 RX ORDER — SODIUM CHLORIDE, SODIUM LACTATE, POTASSIUM CHLORIDE, CALCIUM CHLORIDE 600; 310; 30; 20 MG/100ML; MG/100ML; MG/100ML; MG/100ML
125 INJECTION, SOLUTION INTRAVENOUS CONTINUOUS
OUTPATIENT
Start: 2025-04-01 | End: 2025-04-02

## 2025-04-01 RX ORDER — OXYTOCIN/0.9 % SODIUM CHLORIDE 30/500 ML
250 PLASTIC BAG, INJECTION (ML) INTRAVENOUS CONTINUOUS
OUTPATIENT
Start: 2025-04-01 | End: 2025-04-01

## 2025-04-01 RX ORDER — OXYTOCIN/0.9 % SODIUM CHLORIDE 30/500 ML
999 PLASTIC BAG, INJECTION (ML) INTRAVENOUS ONCE
OUTPATIENT
Start: 2025-04-01 | End: 2025-04-01

## 2025-04-01 RX ORDER — METHYLERGONOVINE MALEATE 0.2 MG/ML
200 INJECTION INTRAVENOUS ONCE AS NEEDED
OUTPATIENT
Start: 2025-04-01

## 2025-04-01 RX ORDER — FAMOTIDINE 20 MG/1
20 TABLET, FILM COATED ORAL 2 TIMES DAILY PRN
OUTPATIENT
Start: 2025-04-01

## 2025-04-01 RX ORDER — SODIUM CHLORIDE 0.9 % (FLUSH) 0.9 %
10 SYRINGE (ML) INJECTION AS NEEDED
OUTPATIENT
Start: 2025-04-01

## 2025-04-01 RX ORDER — OXYTOCIN/0.9 % SODIUM CHLORIDE 30/500 ML
2-20 PLASTIC BAG, INJECTION (ML) INTRAVENOUS
OUTPATIENT
Start: 2025-04-01

## 2025-04-01 RX ORDER — FAMOTIDINE 10 MG/ML
20 INJECTION, SOLUTION INTRAVENOUS 2 TIMES DAILY PRN
OUTPATIENT
Start: 2025-04-01

## 2025-04-01 RX ORDER — MAGNESIUM CARB/ALUMINUM HYDROX 105-160MG
30 TABLET,CHEWABLE ORAL ONCE AS NEEDED
OUTPATIENT
Start: 2025-04-01

## 2025-04-01 RX ORDER — ONDANSETRON 2 MG/ML
4 INJECTION INTRAMUSCULAR; INTRAVENOUS EVERY 6 HOURS PRN
OUTPATIENT
Start: 2025-04-01

## 2025-04-01 RX ORDER — LIDOCAINE HYDROCHLORIDE 10 MG/ML
0.5 INJECTION, SOLUTION INFILTRATION; PERINEURAL ONCE AS NEEDED
OUTPATIENT
Start: 2025-04-01

## 2025-04-01 RX ORDER — SODIUM CHLORIDE 9 MG/ML
40 INJECTION, SOLUTION INTRAVENOUS AS NEEDED
OUTPATIENT
Start: 2025-04-01

## 2025-04-01 RX ORDER — TRANEXAMIC ACID 10 MG/ML
1000 INJECTION, SOLUTION INTRAVENOUS ONCE AS NEEDED
OUTPATIENT
Start: 2025-04-01

## 2025-04-01 RX ORDER — SODIUM CHLORIDE 0.9 % (FLUSH) 0.9 %
10 SYRINGE (ML) INJECTION EVERY 12 HOURS SCHEDULED
OUTPATIENT
Start: 2025-04-01

## 2025-04-01 RX ORDER — MISOPROSTOL 200 UG/1
800 TABLET ORAL ONCE AS NEEDED
OUTPATIENT
Start: 2025-04-01

## 2025-04-01 RX ORDER — TERBUTALINE SULFATE 1 MG/ML
0.25 INJECTION SUBCUTANEOUS AS NEEDED
OUTPATIENT
Start: 2025-04-01

## 2025-04-01 RX ORDER — CARBOPROST TROMETHAMINE 250 UG/ML
250 INJECTION, SOLUTION INTRAMUSCULAR
OUTPATIENT
Start: 2025-04-01

## 2025-04-01 NOTE — PROGRESS NOTES
Chief Complaint   Patient presents with    Routine Prenatal Visit     Cc: ob check today , GBS neg , c/o swollen left foot , reports good FM ,no other complaints , hoping she can be induced next Tuesday        HPI: 29 y.o.  at 36w6d presents for prenatal care.  Patient desires induction of labor if possible next Tuesday at 37 weeks and 6 days.  Will discuss with on-call physician and let her know that is a possibility.  Patient did miss her BPP today.  She reports good fetal movement.    Last OB US Data (since 2024)         Value Time User    EFW%ILE  32%ile 3/18/2025  2:03 PM Caroline Momin MD    AC%ILE  49%ile 3/18/2025  2:03 PM Caroline Momin MD    Placenta location  Anterior 3/25/2025 11:28 AM Caroline Momin MD          Vitals:    25 1603   BP: 110/72   Weight: 93 kg (205 lb)     Total weight gain for pregnancy:  4.99 kg (11 lb)    Review of systems:   Gen: negative  CV:     negative  GI: negative  :   negative and good fetal movement noted   MS:    negative  Neuro: negative  Pul: negative    Physical Exam  Vitals and nursing note reviewed.   Constitutional:       Appearance: Normal appearance.   Pulmonary:      Effort: Pulmonary effort is normal.   Neurological:      Mental Status: She is alert.   Psychiatric:         Mood and Affect: Mood normal.         Thought Content: Thought content normal.         Judgment: Judgment normal.           A/P  1. Intrauterine pregnancy at 36w6d   2. Pregnancy Risk:  HIGH RISK    Diagnoses and all orders for this visit:    1. Prenatal care, subsequent pregnancy in third trimester (Primary)    2. Screening for blood or protein in urine  -     POC Urinalysis Dipstick    3. Maternal anemia in pregnancy, antepartum    4. Asthma during pregnancy  -     Labor Induction; Future    5. Buprenorphine maintenance treatment affecting pregnancy in third trimester  -     Labor Induction; Future    6. History of substance abuse  -     Labor Induction;  Future    7. Tobacco use during pregnancy, antepartum  -     Labor Induction; Future    8. Maternal varicella, non-immune  Overview:  Varicella Zoster Antibody, IgG (08/29/2024 14:33)       9. Club foot of fetus affecting antepartum care of mother, single or unspecified fetus        Pre-eclampsia symptoms were discussed and warnings were given.  Routine labor warnings were discussed and indications for L & D f/u including bleeding, regular contractions, decreased fetal movement or/and rupture of membranes.   Nutrition and weight gain were addressed.  -----------------------  PLAN:   Return in about 4 weeks (around 4/29/2025), or Postpartum visit.      Caroline Momin MD  4/1/2025 16:50 EDT

## 2025-04-02 ENCOUNTER — TELEPHONE (OUTPATIENT)
Dept: OBSTETRICS AND GYNECOLOGY | Age: 30
End: 2025-04-02
Payer: COMMERCIAL

## 2025-04-03 ENCOUNTER — HOSPITAL ENCOUNTER (EMERGENCY)
Facility: HOSPITAL | Age: 30
Discharge: HOME | End: 2025-04-03
Attending: OBSTETRICS & GYNECOLOGY | Admitting: OBSTETRICS & GYNECOLOGY
Payer: COMMERCIAL

## 2025-04-03 VITALS
RESPIRATION RATE: 16 BRPM | TEMPERATURE: 98.1 F | HEART RATE: 74 BPM | OXYGEN SATURATION: 97 % | SYSTOLIC BLOOD PRESSURE: 130 MMHG | DIASTOLIC BLOOD PRESSURE: 70 MMHG

## 2025-04-03 LAB
BACTERIA UR QL AUTO: ABNORMAL /HPF
BILIRUB UR QL STRIP: NEGATIVE
CLARITY UR: ABNORMAL
COLOR UR: YELLOW
GLUCOSE UR STRIP-MCNC: NEGATIVE MG/DL
HGB UR QL STRIP.AUTO: NEGATIVE
HYALINE CASTS UR QL AUTO: ABNORMAL /LPF
KETONES UR QL STRIP: ABNORMAL
LEUKOCYTE ESTERASE UR QL STRIP.AUTO: ABNORMAL
NITRITE UR QL STRIP: NEGATIVE
PH UR STRIP.AUTO: 6 [PH] (ref 5–8)
PROT UR QL STRIP: ABNORMAL
RBC # UR STRIP: ABNORMAL /HPF
REF LAB TEST METHOD: ABNORMAL
SP GR UR STRIP: >1.03 (ref 1–1.03)
SQUAMOUS #/AREA URNS HPF: ABNORMAL /HPF
UROBILINOGEN UR QL STRIP: ABNORMAL
WBC # UR STRIP: ABNORMAL /HPF

## 2025-04-03 PROCEDURE — 87086 URINE CULTURE/COLONY COUNT: CPT | Performed by: OBSTETRICS & GYNECOLOGY

## 2025-04-03 PROCEDURE — 81001 URINALYSIS AUTO W/SCOPE: CPT | Performed by: OBSTETRICS & GYNECOLOGY

## 2025-04-03 PROCEDURE — 59025 FETAL NON-STRESS TEST: CPT

## 2025-04-03 PROCEDURE — 99284 EMERGENCY DEPT VISIT MOD MDM: CPT | Performed by: OBSTETRICS & GYNECOLOGY

## 2025-04-03 RX ORDER — BUPRENORPHINE HYDROCHLORIDE AND NALOXONE HYDROCHLORIDE DIHYDRATE 8; 2 MG/1; MG/1
2 TABLET SUBLINGUAL DAILY
COMMUNITY

## 2025-04-04 NOTE — OBED NOTES
APOLINAR Note OB        Patient Name: Mignon Vasques  YOB: 1995  MRN: [id number]  Admission Date: 4/3/2025  9:12 PM  Date of Service: 4/3/2025    Chief Complaint: Contractions (Contractions since 25 at 1900. Reports fetal movement. Denies vaginal bleeding or leaking of fluid.)        Subjective     Mignon Vasques is a 29 y.o. female  at 37w1d with Estimated Date of Delivery: 25 who presents with the chief complaint listed above.  She has been having contractions since last night.   She sees Caroline Momin MD for her prenatal care. Her pregnancy has been complicated by:  buprenorphine maintenance, history of substance use, tobacco use, anemia, asthma, varicella non-immune, fetal club foot.    She describes fetal movement as normal.  She denies rupture of membranes.  She denies vaginal bleeding. She is feeling contractions.          Objective   Patient Active Problem List    Diagnosis     Club foot, fetal, affecting care of mother, antepartum [O35.HXX0]     Maternal anemia in pregnancy, antepartum [O99.019]     Request for sterilization [Z30.2]     Tobacco use during pregnancy, antepartum [O99.330]     Pregnancy [Z34.90]     History of substance abuse [F19.11]     Buprenorphine maintenance treatment affecting pregnancy [O99.320, F11.20]     Constipation during pregnancy in second trimester [O99.612, K59.00]     Maternal varicella, non-immune [O09.899, Z28.39]     Asthma during pregnancy [O99.519, J45.909]     Back pain [M54.9]     Shoulder instability, right [M25.311]     Lumbar radiculopathy [M54.16]     Protruded lumbar disc [M51.26]     History of depression [Z86.59]     Anxious mood [F41.9]     Sciatic nerve pain [M54.30]         OB History    Para Term  AB Living   8 3 3 0 4 3   SAB IAB Ectopic Molar Multiple Live Births   0 4 0 0 0 3      # Outcome Date GA Lbr Jabari/2nd Weight Sex Type Anes PTL Lv   8 Current            7 IAB            6 IAB            5  IAB 2019           4 Term 05/07/18 39w3d 14:55 2745 g (6 lb 0.8 oz) F Vag-Spont EPI N KATHY      Birth Comments: del note reviewed - no comps - [facility]      Name: MIRZA MARQUEZ      Apgar1: 8  Apgar5: 9   3 IAB 2017           2 Term 2016   3374 g (7 lb 7 oz) F Vag-Spont   KATHY      Name: carlos alberto   1 Term 09/19/13 38w0d  3538 g (7 lb 12.8 oz) M Vag-Spont  N KATHY      Name: bashirdachelsea      Obstetric Comments   EAB x 4 - [facility]    8/29/24 - LMP rejected - IUP at 6-1 weeks - [facility] 4/23/25 - F    12/5/24 - 20-1 weeks -normal completed anatomy other than bilateral clubfeet - -cervical length normal=[facility]    1/7/25 - 24-6 weeks - Anterior placenta. g (50%, AC 55%)   Normal appearing fetal anatomy except suspected R club foot.  Left foot looks normal on today's US.    3/18/25 - 35-6 weeks  - EFW 39 %, AC 44% BPP 8/8 - [facility]         Past Medical History:   Diagnosis Date    Abnormal Pap smear of cervix     Asthma     Back pain     Takes 1-2 Vicodin per day    Back pain     Slipped disc in L3 and L4    Cervical dysplasia     HPV (human papilloma virus) infection     Migraine        Past Surgical History:   Procedure Laterality Date    CHOLECYSTECTOMY      D & C WITH SUCTION      DENTAL PROCEDURE      LAPAROSCOPIC CHOLECYSTECTOMY      NECK SURGERY  04/14/2021       No current facility-administered medications on file prior to encounter.     Current Outpatient Medications on File Prior to Encounter   Medication Sig Dispense Refill    albuterol sulfate  (90 Base) MCG/ACT inhaler Inhale 2 puffs Every 4 (Four) Hours As Needed for Wheezing. 18 g 0    Brixadi 128 MG/0.36ML solution prefilled syringe INJECT 128MG SUBCUTANEOUSLY ONCE MONTHLY      buprenorphine-naloxone (SUBOXONE) 8-2 MG per SL tablet Place 2 tablets under the tongue Daily. 2 tablets 8mg daily      gabapentin (NEURONTIN) 800 MG tablet Take 1 tablet by mouth 3 (Three) Times a Day.      Bonjesta 20-20 MG tablet controlled-release tablet TAKE 1 TABLET BY MOUTH TWICE DAILY  (Patient not taking: Reported on 4/1/2025) 60 tablet 0    docusate sodium (Colace) 100 MG capsule Take 1 capsule by mouth 2 (Two) Times a Day. 60 capsule 1    ferrous sulfate 325 (65 FE) MG tablet Take 1 tablet by mouth Daily With Breakfast. 90 tablet 1    fluticasone (FLONASE) 50 MCG/ACT nasal spray Administer 1 spray into the nostril(s) as directed by provider Daily.      pantoprazole (PROTONIX) 20 MG EC tablet Take 2 tablets by mouth Daily.      PRENATAL GUMMY VITAMIN Chew 1 each Daily. 30 each 11    sennosides-docusate (senna-docusate sodium) 8.6-50 MG per tablet Take 2 tablets by mouth Daily. 60 tablet 5       Allergies   Allergen Reactions    Amoxicillin Hives, Diarrhea and Nausea And Vomiting    Naproxen Other (See Comments)    Tape Other (See Comments)    Adhesive Tape Rash    Benzethonium Chloride Rash       Family History   Problem Relation Age of Onset    Asthma Mother     Diabetes Mother     Hypertension Mother     Ovarian cancer Maternal Grandmother     Breast cancer Neg Hx     Uterine cancer Neg Hx     Colon cancer Neg Hx        Social History     Socioeconomic History    Marital status:    Tobacco Use    Smoking status: Some Days     Current packs/day: 0.50     Average packs/day: 0.5 packs/day for 5.0 years (2.5 ttl pk-yrs)     Types: Cigarettes    Smokeless tobacco: Never   Vaping Use    Vaping status: Never Used   Substance and Sexual Activity    Alcohol use: No    Drug use: Not Currently     Types: Marijuana     Comment: vicodan for back pain     Sexual activity: Yes     Partners: Male     Birth control/protection: None           Review of Systems   Constitutional: Negative.    HENT: Negative.     Respiratory: Negative.     Cardiovascular: Negative.    Gastrointestinal:  Positive for abdominal pain.   Genitourinary: Negative.    Neurological: Negative.         PHYSICAL EXAM:      VITAL SIGNS:  Vitals:    04/03/25 2123   BP: 130/70   Pulse: 88   Resp: 16   Temp: 98.1 °F (36.7 °C)   TempSrc:  Oral   SpO2: 99%        FHT:                   Baseline:  110-120 BPM  Variability:  Moderate = 6 - 25 BPM  Accelerations:  15 x 15 accelerations present     Decelerations:  absent  Contractions:  rare    Interpretation:    Reactive NST, CAT 1 tracing        PHYSICAL EXAM:    General: well developed; well nourished  no acute distress  mentation appropriate   Heart: Not performed.   Lungs  : breathing is unlabored     Abdomen: soft, non-tender; no masses       Cervix: Checked by RN  Cervical Dilation (cm): 0-1  Cervical Effacement: 30  Fetal Station: -3  Cervical Consistency: medium  Cervical Position: posterior   Contractions: rare        Extremities: peripheral pulses normal, no pedal edema, no clubbing or cyanosis      LABS AND TESTING ORDERED:  Uterine and fetal monitoring  Urinalysis      LAB RESULTS:    Recent Results (from the past 24 hours)   Urinalysis With Culture If Indicated - Urine, Clean Catch    Collection Time: 04/03/25  9:34 PM    Specimen: Urine, Clean Catch   Result Value Ref Range    Color, UA Yellow Yellow, Straw    Appearance, UA Cloudy (A) Clear    pH, UA 6.0 5.0 - 8.0    Specific Gravity, UA >1.030 (H) 1.005 - 1.030    Glucose, UA Negative Negative    Ketones, UA Trace (A) Negative    Bilirubin, UA Negative Negative    Blood, UA Negative Negative    Protein, UA Trace (A) Negative    Leuk Esterase, UA Small (1+) (A) Negative    Nitrite, UA Negative Negative    Urobilinogen, UA 1.0 E.U./dL 0.2 - 1.0 E.U./dL   Urinalysis, Microscopic Only - Urine, Clean Catch    Collection Time: 04/03/25  9:34 PM    Specimen: Urine, Clean Catch   Result Value Ref Range    RBC, UA 0-2 None Seen, 0-2 /HPF    WBC, UA 6-10 (A) None Seen, 0-2 /HPF    Bacteria, UA 4+ (A) None Seen /HPF    Squamous Epithelial Cells, UA 13-20 (A) None Seen, 0-2 /HPF    Hyaline Casts, UA None Seen None Seen /LPF    Methodology Automated Microscopy        Lab Results   Component Value Date    ABO A 08/29/2024    RH Positive 08/29/2024        Lab Results   Component Value Date    STREPGPB Negative 2025                 Assessment & Plan     ASSESSMENT/PLAN:  Mignon Vasques is a 29 y.o. female  at 37w1d who presented with: contractions        PLAN:     No change in cervix after 1 hour  Patient discharged home with labor precautions.  She is scheduled for labor induction next week.    I have spent 30 minutes including face to face time with the patient, greater than 50% in discussion of the diagnosis (counseling) and/or coordination of care.     Noemi Montague MD  4/3/2025  21:54 EDT  OB Hospitalist  Phone:  [phone]

## 2025-04-05 LAB — BACTERIA SPEC AEROBE CULT: NORMAL

## 2025-04-08 ENCOUNTER — HOSPITAL ENCOUNTER (INPATIENT)
Facility: HOSPITAL | Age: 30
LOS: 2 days | Discharge: HOME | End: 2025-04-10
Attending: OBSTETRICS & GYNECOLOGY | Admitting: OBSTETRICS & GYNECOLOGY
Payer: COMMERCIAL

## 2025-04-08 ENCOUNTER — HOSPITAL ENCOUNTER (OUTPATIENT)
Dept: LABOR AND DELIVERY | Facility: HOSPITAL | Age: 30
Discharge: HOME | End: 2025-04-08
Payer: COMMERCIAL

## 2025-04-08 ENCOUNTER — ANESTHESIA (OUTPATIENT)
Dept: LABOR AND DELIVERY | Facility: HOSPITAL | Age: 30
End: 2025-04-08
Payer: COMMERCIAL

## 2025-04-08 ENCOUNTER — ANESTHESIA EVENT (OUTPATIENT)
Dept: LABOR AND DELIVERY | Facility: HOSPITAL | Age: 30
End: 2025-04-08
Payer: COMMERCIAL

## 2025-04-08 PROBLEM — F11.20 BUPRENORPHINE MAINTENANCE TREATMENT AFFECTING PREGNANCY IN THIRD TRIMESTER: Status: ACTIVE | Noted: 2025-04-08

## 2025-04-08 PROBLEM — O99.323 BUPRENORPHINE MAINTENANCE TREATMENT AFFECTING PREGNANCY IN THIRD TRIMESTER: Status: ACTIVE | Noted: 2025-04-08

## 2025-04-08 LAB
ABO GROUP BLD: NORMAL
ALBUMIN SERPL-MCNC: 3.4 G/DL (ref 3.5–5.2)
ALBUMIN/GLOB SERPL: 1.1 G/DL
ALP SERPL-CCNC: 178 U/L (ref 39–117)
ALT SERPL W P-5'-P-CCNC: 11 U/L (ref 1–33)
AMPHET+METHAMPHET UR QL: NEGATIVE
AMPHET+METHAMPHET UR QL: NEGATIVE
AMPHETAMINES UR QL: NEGATIVE
AMPHETAMINES UR QL: NEGATIVE
ANION GAP SERPL CALCULATED.3IONS-SCNC: 12.3 MMOL/L (ref 5–15)
AST SERPL-CCNC: 15 U/L (ref 1–32)
BARBITURATES UR QL SCN: NEGATIVE
BARBITURATES UR QL SCN: NEGATIVE
BASOPHILS # BLD AUTO: 0.03 10*3/MM3 (ref 0–0.2)
BASOPHILS NFR BLD AUTO: 0.3 % (ref 0–1.5)
BENZODIAZ UR QL SCN: NEGATIVE
BENZODIAZ UR QL SCN: NEGATIVE
BILIRUB SERPL-MCNC: <0.2 MG/DL (ref 0–1.2)
BLD GP AB SCN SERPL QL: NEGATIVE
BUN SERPL-MCNC: 7 MG/DL (ref 6–20)
BUN/CREAT SERPL: 12.7 (ref 7–25)
BUPRENORPHINE SERPL-MCNC: POSITIVE NG/ML
BUPRENORPHINE SERPL-MCNC: POSITIVE NG/ML
CALCIUM SPEC-SCNC: 9 MG/DL (ref 8.6–10.5)
CANNABINOIDS SERPL QL: NEGATIVE
CANNABINOIDS SERPL QL: NEGATIVE
CHLORIDE SERPL-SCNC: 104 MMOL/L (ref 98–107)
CO2 SERPL-SCNC: 21.7 MMOL/L (ref 22–29)
COCAINE UR QL: NEGATIVE
COCAINE UR QL: NEGATIVE
CREAT SERPL-MCNC: 0.55 MG/DL (ref 0.57–1)
DEPRECATED RDW RBC AUTO: 45.4 FL (ref 37–54)
EGFRCR SERPLBLD CKD-EPI 2021: 128.2 ML/MIN/1.73
EOSINOPHIL # BLD AUTO: 0.1 10*3/MM3 (ref 0–0.4)
EOSINOPHIL NFR BLD AUTO: 1.1 % (ref 0.3–6.2)
ERYTHROCYTE [DISTWIDTH] IN BLOOD BY AUTOMATED COUNT: 12.8 % (ref 12.3–15.4)
FENTANYL UR-MCNC: NEGATIVE NG/ML
FENTANYL UR-MCNC: NEGATIVE NG/ML
GLOBULIN UR ELPH-MCNC: 3.2 GM/DL
GLUCOSE SERPL-MCNC: 137 MG/DL (ref 65–99)
HCT VFR BLD AUTO: 34.1 % (ref 34–46.6)
HGB BLD-MCNC: 11.4 G/DL (ref 12–15.9)
IMM GRANULOCYTES # BLD AUTO: 0.06 10*3/MM3 (ref 0–0.05)
IMM GRANULOCYTES NFR BLD AUTO: 0.6 % (ref 0–0.5)
LYMPHOCYTES # BLD AUTO: 2.18 10*3/MM3 (ref 0.7–3.1)
LYMPHOCYTES NFR BLD AUTO: 23.4 % (ref 19.6–45.3)
MCH RBC QN AUTO: 32.3 PG (ref 26.6–33)
MCHC RBC AUTO-ENTMCNC: 33.4 G/DL (ref 31.5–35.7)
MCV RBC AUTO: 96.6 FL (ref 79–97)
METHADONE UR QL SCN: NEGATIVE
METHADONE UR QL SCN: NEGATIVE
MONOCYTES # BLD AUTO: 0.77 10*3/MM3 (ref 0.1–0.9)
MONOCYTES NFR BLD AUTO: 8.3 % (ref 5–12)
NEUTROPHILS NFR BLD AUTO: 6.19 10*3/MM3 (ref 1.7–7)
NEUTROPHILS NFR BLD AUTO: 66.3 % (ref 42.7–76)
NRBC BLD AUTO-RTO: 0 /100 WBC (ref 0–0.2)
OPIATES UR QL: NEGATIVE
OPIATES UR QL: NEGATIVE
OXYCODONE UR QL SCN: NEGATIVE
OXYCODONE UR QL SCN: NEGATIVE
PCP UR QL SCN: NEGATIVE
PCP UR QL SCN: NEGATIVE
PLATELET # BLD AUTO: 171 10*3/MM3 (ref 140–450)
PMV BLD AUTO: 9.4 FL (ref 6–12)
POTASSIUM SERPL-SCNC: 3.4 MMOL/L (ref 3.5–5.2)
PROT SERPL-MCNC: 6.6 G/DL (ref 6–8.5)
RBC # BLD AUTO: 3.53 10*6/MM3 (ref 3.77–5.28)
RH BLD: POSITIVE
SODIUM SERPL-SCNC: 138 MMOL/L (ref 136–145)
T&S EXPIRATION DATE: NORMAL
TREPONEMA PALLIDUM IGG+IGM AB [PRESENCE] IN SERUM OR PLASMA BY IMMUNOASSAY: NORMAL
TRICYCLICS UR QL SCN: NEGATIVE
TRICYCLICS UR QL SCN: NEGATIVE
WBC NRBC COR # BLD AUTO: 9.33 10*3/MM3 (ref 3.4–10.8)

## 2025-04-08 PROCEDURE — C1755 CATHETER, INTRASPINAL: HCPCS | Performed by: ANESTHESIOLOGY

## 2025-04-08 PROCEDURE — 25010000002 ROPIVACAINE PER 1 MG: Performed by: STUDENT IN AN ORGANIZED HEALTH CARE EDUCATION/TRAINING PROGRAM

## 2025-04-08 PROCEDURE — 80053 COMPREHEN METABOLIC PANEL: CPT | Performed by: OBSTETRICS & GYNECOLOGY

## 2025-04-08 PROCEDURE — 85025 COMPLETE CBC W/AUTO DIFF WBC: CPT | Performed by: OBSTETRICS & GYNECOLOGY

## 2025-04-08 PROCEDURE — 25810000003 LACTATED RINGERS PER 1000 ML: Performed by: OBSTETRICS & GYNECOLOGY

## 2025-04-08 PROCEDURE — G0480 DRUG TEST DEF 1-7 CLASSES: HCPCS | Performed by: OBSTETRICS & GYNECOLOGY

## 2025-04-08 PROCEDURE — 86901 BLOOD TYPING SEROLOGIC RH(D): CPT | Performed by: OBSTETRICS & GYNECOLOGY

## 2025-04-08 PROCEDURE — 80307 DRUG TEST PRSMV CHEM ANLYZR: CPT | Performed by: OBSTETRICS & GYNECOLOGY

## 2025-04-08 PROCEDURE — 86780 TREPONEMA PALLIDUM: CPT | Performed by: OBSTETRICS & GYNECOLOGY

## 2025-04-08 PROCEDURE — 86850 RBC ANTIBODY SCREEN: CPT | Performed by: OBSTETRICS & GYNECOLOGY

## 2025-04-08 PROCEDURE — C1755 CATHETER, INTRASPINAL: HCPCS

## 2025-04-08 PROCEDURE — 25010000002 LIDOCAINE-EPINEPHRINE (PF) 1 %-1:200000 SOLUTION: Performed by: STUDENT IN AN ORGANIZED HEALTH CARE EDUCATION/TRAINING PROGRAM

## 2025-04-08 PROCEDURE — 86900 BLOOD TYPING SEROLOGIC ABO: CPT | Performed by: OBSTETRICS & GYNECOLOGY

## 2025-04-08 PROCEDURE — 25810000003 SODIUM CHLORIDE 0.9 % SOLUTION: Performed by: OBSTETRICS & GYNECOLOGY

## 2025-04-08 RX ORDER — FAMOTIDINE 10 MG/ML
20 INJECTION, SOLUTION INTRAVENOUS ONCE AS NEEDED
Status: DISCONTINUED | OUTPATIENT
Start: 2025-04-08 | End: 2025-04-09 | Stop reason: HOSPADM

## 2025-04-08 RX ORDER — DIPHENHYDRAMINE HYDROCHLORIDE 50 MG/ML
12.5 INJECTION, SOLUTION INTRAMUSCULAR; INTRAVENOUS EVERY 8 HOURS PRN
Status: DISCONTINUED | OUTPATIENT
Start: 2025-04-08 | End: 2025-04-09 | Stop reason: HOSPADM

## 2025-04-08 RX ORDER — EPHEDRINE SULFATE 50 MG/ML
5 INJECTION, SOLUTION INTRAVENOUS
Status: DISCONTINUED | OUTPATIENT
Start: 2025-04-08 | End: 2025-04-09 | Stop reason: HOSPADM

## 2025-04-08 RX ORDER — SODIUM CHLORIDE, SODIUM LACTATE, POTASSIUM CHLORIDE, CALCIUM CHLORIDE 600; 310; 30; 20 MG/100ML; MG/100ML; MG/100ML; MG/100ML
125 INJECTION, SOLUTION INTRAVENOUS CONTINUOUS
Status: DISCONTINUED | OUTPATIENT
Start: 2025-04-09 | End: 2025-04-08

## 2025-04-08 RX ORDER — FENTANYL/ROPIVACAINE/NS/PF 2MCG/ML-.2
10 PLASTIC BAG, INJECTION (ML) INJECTION CONTINUOUS
Refills: 0 | Status: DISCONTINUED | OUTPATIENT
Start: 2025-04-08 | End: 2025-04-09

## 2025-04-08 RX ORDER — METHYLERGONOVINE MALEATE 0.2 MG/ML
200 INJECTION INTRAVENOUS ONCE AS NEEDED
Status: DISCONTINUED | OUTPATIENT
Start: 2025-04-08 | End: 2025-04-09 | Stop reason: HOSPADM

## 2025-04-08 RX ORDER — SODIUM CHLORIDE, SODIUM LACTATE, POTASSIUM CHLORIDE, CALCIUM CHLORIDE 600; 310; 30; 20 MG/100ML; MG/100ML; MG/100ML; MG/100ML
125 INJECTION, SOLUTION INTRAVENOUS CONTINUOUS
Status: ACTIVE | OUTPATIENT
Start: 2025-04-08 | End: 2025-04-08

## 2025-04-08 RX ORDER — ONDANSETRON 2 MG/ML
4 INJECTION INTRAMUSCULAR; INTRAVENOUS ONCE AS NEEDED
Status: DISCONTINUED | OUTPATIENT
Start: 2025-04-08 | End: 2025-04-09 | Stop reason: HOSPADM

## 2025-04-08 RX ORDER — ONDANSETRON 4 MG/1
4 TABLET, ORALLY DISINTEGRATING ORAL EVERY 6 HOURS PRN
Status: DISCONTINUED | OUTPATIENT
Start: 2025-04-08 | End: 2025-04-09 | Stop reason: HOSPADM

## 2025-04-08 RX ORDER — OXYTOCIN/0.9 % SODIUM CHLORIDE 30/500 ML
250 PLASTIC BAG, INJECTION (ML) INTRAVENOUS CONTINUOUS
Status: ACTIVE | OUTPATIENT
Start: 2025-04-09 | End: 2025-04-09

## 2025-04-08 RX ORDER — SODIUM CHLORIDE 9 MG/ML
40 INJECTION, SOLUTION INTRAVENOUS AS NEEDED
Status: DISCONTINUED | OUTPATIENT
Start: 2025-04-08 | End: 2025-04-09 | Stop reason: HOSPADM

## 2025-04-08 RX ORDER — ROPIVACAINE HYDROCHLORIDE 2 MG/ML
INJECTION, SOLUTION EPIDURAL; INFILTRATION; PERINEURAL AS NEEDED
Status: DISCONTINUED | OUTPATIENT
Start: 2025-04-08 | End: 2025-04-09 | Stop reason: SURG

## 2025-04-08 RX ORDER — SODIUM CHLORIDE, SODIUM LACTATE, POTASSIUM CHLORIDE, CALCIUM CHLORIDE 600; 310; 30; 20 MG/100ML; MG/100ML; MG/100ML; MG/100ML
125 INJECTION, SOLUTION INTRAVENOUS CONTINUOUS
Status: DISCONTINUED | OUTPATIENT
Start: 2025-04-09 | End: 2025-04-09

## 2025-04-08 RX ORDER — MISOPROSTOL 200 UG/1
800 TABLET ORAL ONCE AS NEEDED
Status: DISCONTINUED | OUTPATIENT
Start: 2025-04-08 | End: 2025-04-09 | Stop reason: HOSPADM

## 2025-04-08 RX ORDER — ONDANSETRON 2 MG/ML
4 INJECTION INTRAMUSCULAR; INTRAVENOUS EVERY 6 HOURS PRN
Status: DISCONTINUED | OUTPATIENT
Start: 2025-04-08 | End: 2025-04-09 | Stop reason: HOSPADM

## 2025-04-08 RX ORDER — SODIUM CHLORIDE 0.9 % (FLUSH) 0.9 %
10 SYRINGE (ML) INJECTION AS NEEDED
Status: DISCONTINUED | OUTPATIENT
Start: 2025-04-08 | End: 2025-04-09 | Stop reason: HOSPADM

## 2025-04-08 RX ORDER — OXYTOCIN/0.9 % SODIUM CHLORIDE 30/500 ML
999 PLASTIC BAG, INJECTION (ML) INTRAVENOUS ONCE
Status: DISCONTINUED | OUTPATIENT
Start: 2025-04-09 | End: 2025-04-09 | Stop reason: HOSPADM

## 2025-04-08 RX ORDER — FAMOTIDINE 20 MG/1
20 TABLET, FILM COATED ORAL 2 TIMES DAILY PRN
Status: DISCONTINUED | OUTPATIENT
Start: 2025-04-08 | End: 2025-04-09 | Stop reason: HOSPADM

## 2025-04-08 RX ORDER — MAGNESIUM CARB/ALUMINUM HYDROX 105-160MG
30 TABLET,CHEWABLE ORAL ONCE AS NEEDED
Status: DISCONTINUED | OUTPATIENT
Start: 2025-04-08 | End: 2025-04-09 | Stop reason: HOSPADM

## 2025-04-08 RX ORDER — SODIUM CHLORIDE 0.9 % (FLUSH) 0.9 %
10 SYRINGE (ML) INJECTION EVERY 12 HOURS SCHEDULED
Status: DISCONTINUED | OUTPATIENT
Start: 2025-04-08 | End: 2025-04-09 | Stop reason: HOSPADM

## 2025-04-08 RX ORDER — LIDOCAINE HYDROCHLORIDE 10 MG/ML
0.5 INJECTION, SOLUTION INFILTRATION; PERINEURAL ONCE AS NEEDED
Status: DISCONTINUED | OUTPATIENT
Start: 2025-04-08 | End: 2025-04-09 | Stop reason: HOSPADM

## 2025-04-08 RX ORDER — FAMOTIDINE 10 MG/ML
20 INJECTION, SOLUTION INTRAVENOUS 2 TIMES DAILY PRN
Status: DISCONTINUED | OUTPATIENT
Start: 2025-04-08 | End: 2025-04-09 | Stop reason: HOSPADM

## 2025-04-08 RX ORDER — OXYTOCIN/0.9 % SODIUM CHLORIDE 30/500 ML
2-20 PLASTIC BAG, INJECTION (ML) INTRAVENOUS
Status: DISCONTINUED | OUTPATIENT
Start: 2025-04-08 | End: 2025-04-09 | Stop reason: HOSPADM

## 2025-04-08 RX ORDER — TERBUTALINE SULFATE 1 MG/ML
0.25 INJECTION SUBCUTANEOUS AS NEEDED
Status: DISCONTINUED | OUTPATIENT
Start: 2025-04-08 | End: 2025-04-09 | Stop reason: HOSPADM

## 2025-04-08 RX ORDER — ACETAMINOPHEN 325 MG/1
650 TABLET ORAL EVERY 4 HOURS PRN
Status: DISCONTINUED | OUTPATIENT
Start: 2025-04-08 | End: 2025-04-09 | Stop reason: HOSPADM

## 2025-04-08 RX ADMIN — SODIUM CHLORIDE 300 ML: 9 INJECTION, SOLUTION INTRAVENOUS at 23:26

## 2025-04-08 RX ADMIN — Medication 2 MILLI-UNITS/MIN: at 10:07

## 2025-04-08 RX ADMIN — ROPIVACAINE HYDROCHLORIDE 5 ML: 2 INJECTION, SOLUTION EPIDURAL; INFILTRATION at 14:25

## 2025-04-08 RX ADMIN — SODIUM CHLORIDE, POTASSIUM CHLORIDE, SODIUM LACTATE AND CALCIUM CHLORIDE 125 ML/HR: 600; 310; 30; 20 INJECTION, SOLUTION INTRAVENOUS at 15:51

## 2025-04-08 RX ADMIN — SODIUM CHLORIDE, POTASSIUM CHLORIDE, SODIUM LACTATE AND CALCIUM CHLORIDE 125 ML/HR: 600; 310; 30; 20 INJECTION, SOLUTION INTRAVENOUS at 09:52

## 2025-04-08 RX ADMIN — Medication 10 ML/HR: at 14:25

## 2025-04-08 RX ADMIN — SODIUM CHLORIDE, POTASSIUM CHLORIDE, SODIUM LACTATE AND CALCIUM CHLORIDE 999 ML/HR: 600; 310; 30; 20 INJECTION, SOLUTION INTRAVENOUS at 23:29

## 2025-04-08 RX ADMIN — LIDOCAINE HYDROCHLORIDE 3 ML: 10; .005 INJECTION, SOLUTION EPIDURAL; INFILTRATION; INTRACAUDAL; PERINEURAL at 14:20

## 2025-04-08 RX ADMIN — Medication 10 ML/HR: at 22:00

## 2025-04-08 RX ADMIN — EPHEDRINE SULFATE 5 MG: 50 INJECTION INTRAVENOUS at 14:37

## 2025-04-08 NOTE — ANESTHESIA PROCEDURE NOTES
Labor Epidural      Patient reassessed immediately prior to procedure    Patient location during procedure: OB  Start Time: 4/8/2025 2:04 PM  Performed By  Anesthesiologist: Oziel Saleh MD  Preanesthetic Checklist  Completed: patient identified, risks and benefits discussed and timeout performed  Prep:  Pt Position:sitting  Sterile Tech:cap, gloves, mask and sterile barrier  Prep:chlorhexidine gluconate and isopropyl alcohol  Monitoring:blood pressure monitoring, continuous pulse oximetry and EKG  Epidural Block Procedure:  Approach:midline  Guidance:landmark technique and palpation technique  Location:L2-L3  Needle Type:Tuohy  Needle Gauge:17 G  Loss of Resistance Medium: saline  Loss of Resistance: 6cm  Cath Depth at skin:12 cm  Paresthesia: none  Aspiration:negative  Test Dose:negative  Number of Attempts: 1  Post Assessment:  Dressing:occlusive dressing applied and secured with tape  Pt Tolerance:patient tolerated the procedure well with no apparent complications  Complications:no

## 2025-04-08 NOTE — H&P
[facility]  Obstetric History and Physical    Chief Complaint   Patient presents with    Scheduled Induction     IOL for suspected fetal damage d/t drug/ alcohol use. Patient reports active fetal movement. Denies questions or concerns        Subjective     Patient is a 29 y.o. female  currently at 37w6d, who presents for induction of labor due to Suboxone use in pregnancy.  Baby also has right clubfoot.  Patient notes that she takes 18 mg of Suboxone every morning.  She is also on gabapentin.  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      Her prenatal care is located by Suboxone use, gabapentin use, smoking, fetus with right clubfoot, anemia, and depression.  Her previous obstetric/gynecological history is noted for prior vaginal deliveries.    The following portions of the patients history were reviewed and updated as appropriate: past medical history, past surgical history, past social history, and problem list .       Prenatal Information:  Prenatal Results       Initial Prenatal Labs       Test Value Reference Range Date Time    Hemoglobin  13.5 g/dL 11.1 - 15.9 24 1433    Hematocrit  41.1 % 34.0 - 46.6 24 1433    Platelets  262 x10E3/uL 150 - 450 24 1433    Rubella IgG  2.75 index Immune >0.99 24 1433    Hepatitis B SAg  Negative  Negative 24 1433    Hepatitis C Ab  Non Reactive  Non Reactive 24 1433    RPR  Non Reactive  Non Reactive 24 1433    T. Pallidum Ab   Non Reactive  Non Reactive 25 1040    ABO  A   24 1433    Rh  Positive   24 1433    Antibody Screen  Negative  Negative 24 1433    HIV  Non Reactive  Non Reactive 24 1433    Urine Culture  <25,000 CFU/mL Mixed Jami Isolated   25 2134       Final report   24 1433    Gonorrhea  Negative  Negative 24 1507    Chlamydia  Negative  Negative 24 1507    TSH        HgB A1c   5.6 % 4.8 - 5.6 24 1433    Varicella IgG  <135  index Immune >165 08/29/24 1433    Hemoglobinopathy Fractionation  Comment   08/29/24 1433    Hemoglobinopathy (genetic testing)        Cystic fibrosis   Negative   09/24/24 1206    Spinal muscular atrophy  Negative   09/24/24 1206    Fragile X                  Fetal testing        Test Value Reference Range Date Time    NIPT        MSAFP  *Screen Negative*   11/06/24 1256    AFP-4                  2nd and 3rd Trimester       Test Value Reference Range Date Time    Hemoglobin (repeated)  11.4 g/dL 12.0 - 15.9 04/08/25 0905       10.8 g/dL 11.1 - 15.9 01/21/25 1040    Hematocrit (repeated)  34.1 % 34.0 - 46.6 04/08/25 0905       32.9 % 34.0 - 46.6 01/21/25 1040    Platelets   171 10*3/mm3 140 - 450 04/08/25 0905       199 x10E3/uL 150 - 450 01/21/25 1040       262 x10E3/uL 150 - 450 08/29/24 1433    1 hour GTT   99 mg/dL 70 - 139 01/21/25 1040    Antibody Screen (repeated)        3rd TM syphilis scrn (repeated)  RPR         3rd TM syphilis scrn (repeated) TP-Ab  Non Reactive  Non Reactive 01/21/25 1040    3rd TM syphilis screen TB-Ab (FTA)  Non Reactive  Non Reactive 01/21/25 1040    Syphilis cascade test TP-Ab (EIA)        Syphilis cascade TPPA        GTT Fasting        GTT 1 Hr        GTT 2 Hr        GTT 3 Hr        Group B Strep  Negative  Negative 03/25/25 1136              Other testing        Test Value Reference Range Date Time    Parvo IgG         CMV IgG                   Drug Screening       Test Value Reference Range Date Time    Amphetamine Screen  Negative  Negative 04/08/25 0905       Negative ng/mL Xrxfvp=7510 08/29/24 1510    Barbiturate Screen  Negative  Negative 04/08/25 0905       Negative ng/mL Keaobe=399 08/29/24 1510    Benzodiazepine Screen  Negative  Negative 04/08/25 0905       Negative ng/mL Iskojs=451 08/29/24 1510    Methadone Screen  Negative  Negative 04/08/25 0905       Negative ng/mL Shrggw=482 08/29/24 1510    Phencyclidine Screen  Negative  Negative 04/08/25 0905       Negative ng/mL  Cutoff=25 08/29/24 1510    Opiates Screen  Negative  Negative 04/08/25 0905       Negative ng/mL Iratjp=533 08/29/24 1510    THC Screen  Negative  Negative 04/08/25 0905       Negative ng/mL Cutoff=50 08/29/24 1510    Cocaine Screen  Negative  Negative 04/08/25 0905       Negative ng/mL Djptpe=082 08/29/24 1510    Propoxyphene Screen  Negative ng/mL Yddegq=438 08/29/24 1510    Buprenorphine Screen  Positive  Negative 04/08/25 0905    Methamphetamine Screen  Negative  Negative 04/08/25 0905    Oxycodone Screen  Negative  Negative 04/08/25 0905    Tricyclic Antidepressants Screen  Negative  Negative 04/08/25 0905              Legend    ^: Historical                          External Prenatal Results       Pregnancy Outside Results - Transcribed From Office Records - See Scanned Records For Details       Test Value Date Time    ABO  A  08/29/24 1433    Rh  Positive  08/29/24 1433    Antibody Screen  Negative  08/29/24 1433    Varicella IgG  <135 index 08/29/24 1433    Rubella  2.75 index 08/29/24 1433    Hgb  11.4 g/dL 04/08/25 0905       10.8 g/dL 01/21/25 1040       13.5 g/dL 08/29/24 1433    Hct  34.1 % 04/08/25 0905       32.9 % 01/21/25 1040       41.1 % 08/29/24 1433    HgB A1c   5.6 % 08/29/24 1433    1h GTT  99 mg/dL 01/21/25 1040    3h GTT Fasting       3h GTT 1 hour       3h GTT 2 hour       3h GTT 3 hour        Gonorrhea (discrete)  Negative  08/29/24 1507    Chlamydia (discrete)  Negative  08/29/24 1507    RPR  Non Reactive  08/29/24 1433    Syphils cascade: TP-Ab (FTA)  Non Reactive  01/21/25 1040    TP-Ab  Non Reactive  01/21/25 1040    TP-Ab (EIA)       TPPA       HBsAg  Negative  08/29/24 1433    Herpes Simplex Virus PCR       Herpes Simplex VIrus Culture       HIV  Non Reactive  08/29/24 1433    Hep C RNA Quant PCR       Hep C Antibody  Non Reactive  08/29/24 1433    AFP  13.5 ng/mL 11/06/24 1256    NIPT       Cystic Fibrosis (Yahaira)  Negative  09/24/24 1206    Cystic Fibroisis        Spinal Muscular  atrophy  Negative  24 1206    Fragile X       Group B Strep  Negative  25 1136    GBS Susceptibility to Clindamycin       GBS Susceptibility to Erythromycin       Fetal Fibronectin       Genetic Testing, Maternal Blood                 Drug Screening       Test Value Date Time    Urine Drug Screen       Amphetamine Screen  Negative  25 0905       Negative ng/mL 24 1510    Barbiturate Screen  Negative  25 0905       Negative ng/mL 24 1510    Benzodiazepine Screen  Negative  25 0905       Negative ng/mL 24 1510    Methadone Screen  Negative  25 0905       Negative ng/mL 24 1510    Phencyclidine Screen  Negative  25 0905       Negative ng/mL 24 1510    Opiates Screen  Negative  25 0905    THC Screen  Negative  25 0905    Cocaine Screen       Propoxyphene Screen  Negative ng/mL 24 1510    Buprenorphine Screen  Positive  25 0905    Methamphetamine Screen       Oxycodone Screen  Negative  25 0905    Tricyclic Antidepressants Screen  Negative  25 0905              Legend    ^: Historical                             Past OB History:     OB History    Para Term  AB Living   8 3 3 0 4 3   SAB IAB Ectopic Molar Multiple Live Births   0 4 0 0 0 3      # Outcome Date GA Lbr Jabari/2nd Weight Sex Type Anes PTL Lv   8 Current            7 IAB            6 IAB            5 IAB            4 Term 18 39w3d 14:55 2745 g (6 lb 0.8 oz) F Vag-Spont EPI N KATHY      Birth Comments: del note reviewed - no comps - [facility]      Name: MIRZA MARQUEZ      Apgar1: 8  Apgar5: 9   3 IAB            2 Term 2016   3374 g (7 lb 7 oz) F Vag-Spont   KATHY      Name: [email or web address]   1 Term 13 38w0d  3538 g (7 lb 12.8 oz) M Vag-Spont  N KATHY      Name: bashirdachelsea      Obstetric Comments   EAB x 4 - [facility]    24 - LMP rejected - IUP at 6-1 weeks - FEDERICO 25 - [facility]    24 - 20-1 weeks -normal completed anatomy other than  bilateral clubfeet - -cervical length normal=[facility]    1/7/25 - 24-6 weeks - Anterior placenta. g (50%, AC 55%)   Normal appearing fetal anatomy except suspected R club foot.  Left foot looks normal on today's US.    3/18/25 - 35-6 weeks  - EFW 39 %, AC 44% BPP 8/8 - [facility]        Past Medical History: Past Medical History:   Diagnosis Date    Abnormal Pap smear of cervix     Asthma     Back pain     Takes 1-2 Vicodin per day    Back pain     Slipped disc in L3 and L4    Cervical dysplasia     HPV (human papilloma virus) infection     Migraine       Past Surgical History Past Surgical History:   Procedure Laterality Date    CHOLECYSTECTOMY      D & C WITH SUCTION      DENTAL PROCEDURE      LAPAROSCOPIC CHOLECYSTECTOMY      NECK SURGERY  04/14/2021      Family History: Family History   Problem Relation Age of Onset    Asthma Mother     Diabetes Mother     Hypertension Mother     Ovarian cancer Maternal Grandmother     Breast cancer Neg Hx     Uterine cancer Neg Hx     Colon cancer Neg Hx       Social History:  reports that she has been smoking cigarettes. She has a 2.5 pack-year smoking history. She has never used smokeless tobacco.   reports no history of alcohol use.   reports that she does not currently use drugs after having used the following drugs: Marijuana.        General ROS:  Notes good fetal movements, no vaginal bleeding or loss of fluid.    Objective       Vital Signs Range for the last 24 hours  Temperature: Temp:  [97.8 °F (36.6 °C)] 97.8 °F (36.6 °C)   Temp Source: Temp src: Oral   BP: BP: (112)/(63) 112/63   Pulse: Heart Rate:  [91] 91   Respirations: Resp:  [18] 18   SPO2:     O2 Amount (l/min):     O2 Devices     Weight: Weight:  [93.2 kg (205 lb 6.4 oz)] 93.2 kg (205 lb 6.4 oz)     Physical Examination: General appearance - alert, well appearing, and in no distress  Mental status - normal mood, behavior, speech, dress, motor activity, and thought processes  Eyes - sclera anicteric  Abdomen  -obese, gravid, size equal dates  Pelvic -cervix is 3 cm 60% posterior and -2 station    Presentation: vertex   Cervix: Exam by:     Dilation: Cervical Dilation (cm): 2-3   Effacement: Cervical Effacement: 60   Station:         Fetal Heart Rate Assessment   Method:     Beats/min:     Baseline:     Variability:     Accels:     Decels:     Tracing Category:       Uterine Assessment   Method:     Frequency (min):     Ctx Count in 10 min:     Duration:     Intensity:     Intensity by IUPC:     Resting Tone:     Resting Tone by IUPC:     Bozman Units:       Laboratory Results:   Results from last 7 days   Lab Units 04/08/25  0905   WBC 10*3/mm3 9.33   HEMOGLOBIN g/dL 11.4*   HEMATOCRIT % 34.1   PLATELETS 10*3/mm3 171     Results from last 7 days   Lab Units 04/08/25  0905   SODIUM mmol/L 138   POTASSIUM mmol/L 3.4*   CHLORIDE mmol/L 104   CO2 mmol/L 21.7*   BUN mg/dL 7   CREATININE mg/dL 0.55*   CALCIUM mg/dL 9.0   BILIRUBIN mg/dL <0.2   ALK PHOS U/L 178*   ALT (SGPT) U/L 11   AST (SGOT) U/L 15   GLUCOSE mg/dL 137*       Other Studies: Most recent fetal weight 3 weeks ago showed an estimated fetal weight at the 32nd percentile.  Abdominal circumference at the 49th percentile.    Assessment & Plan       Buprenorphine maintenance treatment affecting pregnancy in third trimester    Anxious mood    Pregnancy    Buprenorphine maintenance treatment affecting pregnancy    Tobacco use during pregnancy, antepartum    Maternal anemia in pregnancy, antepartum    Club foot, fetal, affecting care of mother, antepartum        Assessment:  1.  Intrauterine pregnancy at 37w6d gestation with reactive, reassuring fetal status.    2.  Duction of labor for Suboxone use-cervix is favorable.  3.  Obstetrical history significant for  Suboxone use, gabapentin use, smoking, fetal clubfoot on the right, anemia and depression .  4.  GBS status:   Strep Gp B MORAIMA   Date Value Ref Range Status   03/25/2025 Negative Negative Final     Comment:      Centers for Disease Control and Prevention (CDC) and American Congress  of Obstetricians and Gynecologists (ACOG) guidelines for prevention of   group B streptococcal (GBS) disease specify co-collection of  a vaginal and rectal swab specimen to maximize sensitivity of GBS  detection. Per the CDC and ACOG, swabbing both the lower vagina and  rectum substantially increases the yield of detection compared with  sampling the vagina alone.  Penicillin G, ampicillin, or cefazolin are indicated for intrapartum  prophylaxis of  GBS colonization. Reflex susceptibility  testing should be performed prior to use of clindamycin only on GBS  isolates from penicillin-allergic women who are considered a high risk  for anaphylaxis. Treatment with vancomycin without additional testing  is warranted if resistance to clindamycin is noted.         Plan:  1. fetal and uterine monitoring  continuously, labor augmentation  Pitocin, and analgesia with  epidural  2. Plan of care has been reviewed with patient and her partner  3.  Risks, benefits of treatment plan have been discussed.  4.  All questions have been answered.        Jose Lauren MD  2025  09:40 EDT

## 2025-04-08 NOTE — ANESTHESIA PREPROCEDURE EVALUATION
Anesthesia Evaluation     Patient summary reviewed and Nursing notes reviewed   NPO Solid Status: > 8 hours  NPO Liquid Status: > 2 hours           Airway   Mallampati: II  TM distance: >3 FB  Neck ROM: full  Dental      Pulmonary    (+) asthma,  Cardiovascular - negative cardio ROS        Neuro/Psych  (+) headaches, numbness, psychiatric history Anxiety  GI/Hepatic/Renal/Endo      Musculoskeletal     (+) back pain      ROS comment: Disc bulge at L4/L5 and L5/S1. Reports that she has had isolated left lower extremity numbness as a result. No motor weakness or bladder/bowel issues. Reports having and epidural steroid injection in the past.  Abdominal    Substance History      OB/GYN    (+) Pregnant        Other                          Anesthesia Plan    ASA 3     epidural     (37w6d)    Anesthetic plan, risks, benefits, and alternatives have been provided, discussed and informed consent has been obtained with: patient.        CODE STATUS:    Code Status (Patient has no pulse and is not breathing): CPR (Attempt to Resuscitate)  Medical Interventions (Patient has pulse or is breathing): Full Support  Level Of Support Discussed With: Patient

## 2025-04-09 ENCOUNTER — APPOINTMENT (OUTPATIENT)
Dept: CARDIOLOGY | Facility: HOSPITAL | Age: 30
End: 2025-04-09
Payer: COMMERCIAL

## 2025-04-09 PROBLEM — R30.0 DYSURIA: Status: ACTIVE | Noted: 2025-04-09

## 2025-04-09 PROBLEM — M79.662 PAIN OF LEFT CALF: Status: ACTIVE | Noted: 2025-04-09

## 2025-04-09 LAB
ATMOSPHERIC PRESS: 753.9 MMHG
ATMOSPHERIC PRESS: 755 MMHG
BASE EXCESS BLDCOA CALC-SCNC: -0.8 MMOL/L (ref -2–2)
BASE EXCESS BLDCOV CALC-SCNC: -2.1 MMOL/L (ref -30–30)
BDY SITE: ABNORMAL
BDY SITE: NORMAL
DEVICE COMMENT: ABNORMAL
DEVICE COMMENT: NORMAL
HCO3 BLDCOA-SCNC: 25.6 MMOL/L (ref 22–28)
HCO3 BLDCOV-SCNC: 23 MMOL/L
MODALITY: ABNORMAL
MODALITY: NORMAL
PCO2 BLDCOA: 47.2 MMHG (ref 43–63)
PCO2 BLDCOV: 39.7 MM HG (ref 35–51.3)
PH BLDCOA: 7.34 PH UNITS (ref 7.18–7.34)
PH BLDCOV: 7.37 PH UNITS (ref 7.26–7.4)
PO2 BLDCOA: 30.8 MMHG (ref 12–26)
PO2 BLDCOV: 35.8 MM HG (ref 19–39)
SAO2 % BLDCOA: 54.7 %
SAO2 % BLDCOV: NORMAL %

## 2025-04-09 PROCEDURE — 93970 EXTREMITY STUDY: CPT

## 2025-04-09 PROCEDURE — 59410 OBSTETRICAL CARE: CPT | Performed by: OBSTETRICS & GYNECOLOGY

## 2025-04-09 PROCEDURE — 82803 BLOOD GASES ANY COMBINATION: CPT | Performed by: OBSTETRICS & GYNECOLOGY

## 2025-04-09 PROCEDURE — 88307 TISSUE EXAM BY PATHOLOGIST: CPT

## 2025-04-09 PROCEDURE — 93970 EXTREMITY STUDY: CPT | Performed by: SURGERY

## 2025-04-09 RX ORDER — DOCUSATE SODIUM 100 MG/1
100 CAPSULE, LIQUID FILLED ORAL 2 TIMES DAILY
Status: DISCONTINUED | OUTPATIENT
Start: 2025-04-09 | End: 2025-04-10 | Stop reason: HOSPADM

## 2025-04-09 RX ORDER — IBUPROFEN 600 MG/1
600 TABLET, FILM COATED ORAL EVERY 6 HOURS SCHEDULED
Status: DISCONTINUED | OUTPATIENT
Start: 2025-04-09 | End: 2025-04-09

## 2025-04-09 RX ORDER — CARBOPROST TROMETHAMINE 250 UG/ML
250 INJECTION, SOLUTION INTRAMUSCULAR
Status: DISCONTINUED | OUTPATIENT
Start: 2025-04-09 | End: 2025-04-10 | Stop reason: HOSPADM

## 2025-04-09 RX ORDER — HYDROCODONE BITARTRATE AND ACETAMINOPHEN 5; 325 MG/1; MG/1
1 TABLET ORAL EVERY 4 HOURS PRN
Status: DISCONTINUED | OUTPATIENT
Start: 2025-04-09 | End: 2025-04-09

## 2025-04-09 RX ORDER — BUPRENORPHINE HYDROCHLORIDE AND NALOXONE HYDROCHLORIDE DIHYDRATE 8; 2 MG/1; MG/1
2 TABLET SUBLINGUAL DAILY
Status: DISCONTINUED | OUTPATIENT
Start: 2025-04-09 | End: 2025-04-10 | Stop reason: HOSPADM

## 2025-04-09 RX ORDER — DIPHENHYDRAMINE HCL 25 MG
25 CAPSULE ORAL NIGHTLY PRN
Status: DISCONTINUED | OUTPATIENT
Start: 2025-04-09 | End: 2025-04-10 | Stop reason: HOSPADM

## 2025-04-09 RX ORDER — ACETAMINOPHEN 325 MG/1
650 TABLET ORAL EVERY 6 HOURS PRN
Status: DISCONTINUED | OUTPATIENT
Start: 2025-04-09 | End: 2025-04-10 | Stop reason: HOSPADM

## 2025-04-09 RX ORDER — FLUTICASONE PROPIONATE 50 MCG
1 SPRAY, SUSPENSION (ML) NASAL DAILY
Status: DISCONTINUED | OUTPATIENT
Start: 2025-04-09 | End: 2025-04-10 | Stop reason: HOSPADM

## 2025-04-09 RX ORDER — GABAPENTIN 400 MG/1
800 CAPSULE ORAL EVERY 8 HOURS SCHEDULED
Status: DISCONTINUED | OUTPATIENT
Start: 2025-04-09 | End: 2025-04-09

## 2025-04-09 RX ORDER — OXYCODONE HYDROCHLORIDE 10 MG/1
10 TABLET ORAL EVERY 8 HOURS PRN
Refills: 0 | Status: DISCONTINUED | OUTPATIENT
Start: 2025-04-09 | End: 2025-04-09

## 2025-04-09 RX ORDER — TRANEXAMIC ACID 10 MG/ML
1000 INJECTION, SOLUTION INTRAVENOUS ONCE AS NEEDED
Status: DISCONTINUED | OUTPATIENT
Start: 2025-04-09 | End: 2025-04-10 | Stop reason: HOSPADM

## 2025-04-09 RX ORDER — ALBUTEROL SULFATE 90 UG/1
2 INHALANT RESPIRATORY (INHALATION) EVERY 4 HOURS PRN
Status: DISCONTINUED | OUTPATIENT
Start: 2025-04-09 | End: 2025-04-10 | Stop reason: HOSPADM

## 2025-04-09 RX ORDER — HYDROCODONE BITARTRATE AND ACETAMINOPHEN 10; 325 MG/1; MG/1
1 TABLET ORAL EVERY 4 HOURS PRN
Status: DISCONTINUED | OUTPATIENT
Start: 2025-04-09 | End: 2025-04-09

## 2025-04-09 RX ORDER — GABAPENTIN 400 MG/1
800 CAPSULE ORAL EVERY 6 HOURS
Status: DISCONTINUED | OUTPATIENT
Start: 2025-04-09 | End: 2025-04-10 | Stop reason: HOSPADM

## 2025-04-09 RX ORDER — ACETAMINOPHEN 500 MG
1000 TABLET ORAL ONCE
Status: COMPLETED | OUTPATIENT
Start: 2025-04-09 | End: 2025-04-09

## 2025-04-09 RX ORDER — IBUPROFEN 600 MG/1
600 TABLET, FILM COATED ORAL EVERY 6 HOURS PRN
Status: DISCONTINUED | OUTPATIENT
Start: 2025-04-09 | End: 2025-04-09

## 2025-04-09 RX ORDER — ONDANSETRON 2 MG/ML
4 INJECTION INTRAMUSCULAR; INTRAVENOUS EVERY 6 HOURS PRN
Status: DISCONTINUED | OUTPATIENT
Start: 2025-04-09 | End: 2025-04-10 | Stop reason: HOSPADM

## 2025-04-09 RX ORDER — OXYTOCIN/0.9 % SODIUM CHLORIDE 30/500 ML
125 PLASTIC BAG, INJECTION (ML) INTRAVENOUS ONCE AS NEEDED
Status: COMPLETED | OUTPATIENT
Start: 2025-04-09 | End: 2025-04-09

## 2025-04-09 RX ORDER — HYDROCORTISONE 25 MG/G
CREAM TOPICAL AS NEEDED
Status: DISCONTINUED | OUTPATIENT
Start: 2025-04-09 | End: 2025-04-10 | Stop reason: HOSPADM

## 2025-04-09 RX ORDER — BISACODYL 10 MG
10 SUPPOSITORY, RECTAL RECTAL DAILY PRN
Status: DISCONTINUED | OUTPATIENT
Start: 2025-04-10 | End: 2025-04-10 | Stop reason: HOSPADM

## 2025-04-09 RX ORDER — OXYCODONE HYDROCHLORIDE 10 MG/1
10 TABLET ORAL EVERY 6 HOURS PRN
Refills: 0 | Status: DISCONTINUED | OUTPATIENT
Start: 2025-04-09 | End: 2025-04-10 | Stop reason: HOSPADM

## 2025-04-09 RX ADMIN — GABAPENTIN 800 MG: 400 CAPSULE ORAL at 09:55

## 2025-04-09 RX ADMIN — GABAPENTIN 800 MG: 400 CAPSULE ORAL at 02:00

## 2025-04-09 RX ADMIN — ACETAMINOPHEN 1000 MG: 500 TABLET, FILM COATED ORAL at 01:14

## 2025-04-09 RX ADMIN — OXYCODONE HYDROCHLORIDE 10 MG: 10 TABLET ORAL at 12:10

## 2025-04-09 RX ADMIN — OXYCODONE HYDROCHLORIDE 10 MG: 10 TABLET ORAL at 03:34

## 2025-04-09 RX ADMIN — DOCUSATE SODIUM 100 MG: 100 CAPSULE, LIQUID FILLED ORAL at 22:04

## 2025-04-09 RX ADMIN — GABAPENTIN 800 MG: 400 CAPSULE ORAL at 15:22

## 2025-04-09 RX ADMIN — GABAPENTIN 800 MG: 400 CAPSULE ORAL at 22:04

## 2025-04-09 RX ADMIN — OXYCODONE HYDROCHLORIDE 10 MG: 10 TABLET ORAL at 18:28

## 2025-04-09 RX ADMIN — DOCUSATE SODIUM 100 MG: 100 CAPSULE, LIQUID FILLED ORAL at 09:56

## 2025-04-09 RX ADMIN — Medication 125 ML/HR: at 01:35

## 2025-04-09 NOTE — PROGRESS NOTES
"[facility]  Vaginal Delivery Progress Note    Subjective   Postpartum Day 0: Vaginal Delivery    The patient feels well.  Her pain is adequately controlled.  She complains of mild discomfort with voiding after delivery.  Patient describes her bleeding as staining only.    ROS:  : pos for discomfort with void postpartum, neg for heavy bleeding  Musculoskeletal: pos for left calf tenderness    Objective     Vital Signs Range for the last 24 hours  Temperature: Temp:  [97.2 °F (36.2 °C)-98.5 °F (36.9 °C)] 98.3 °F (36.8 °C)   Temp Source: Temp src: Oral   BP: BP: ()/(43-93) 112/72   Pulse: Heart Rate:  [63-92] 92   Respirations: Resp:  [16-18] 16   SPO2: SpO2:  [94 %-100 %] 99 %   O2 Amount (l/min):     O2 Devices     Weight:       Admit Height:  Height: 160 cm (63\")      Physical Exam:  General:  no acute distress.  Abdomen: Fundus: appropriate, firm, non tender  Extremities: Bilateral lower ext with trace edema, no cords, pt notes tenderness with palpation of left calf.         Rubella:  Transcribed from prenatal record --    Rubella Antibodies, IgG   Date Value Ref Range Status   08/29/2024 2.75 Immune >0.99 index Final     Comment:                                     Non-immune       <0.90                                  Equivocal  0.90 - 0.99                                  Immune           >0.99       Rh Status:    RH type   Date Value Ref Range Status   04/08/2025 Positive  Final     Rh Factor   Date Value Ref Range Status   08/29/2024 Positive  Final     Comment:     Please note: Prior records for this patient's ABO / Rh type are not  available for additional verification.       Immunizations:   Immunization History   Administered Date(s) Administered    COVID-19 (StatSheet) 03/15/2022    DTP / HiB 1996, 02/20/1997    DTaP, Unspecified 04/24/1997, 04/27/1999    Flu Vaccine Split Quad 10/27/2015    Hep B, Adolescent or Pediatric 1996, 04/24/1997    HiB 04/24/1997    MMR 04/27/1999    OPV " 1996, 02/20/1997, 04/27/1999    Tdap 03/21/2016, 01/21/2025    Varicella 04/27/1999       Assessment & Plan       Buprenorphine maintenance treatment affecting pregnancy in third trimester    Anxious mood    Lumbar radiculopathy    Protruded lumbar disc    Sciatic nerve pain    Pregnancy    Buprenorphine maintenance treatment affecting pregnancy    Tobacco use during pregnancy, antepartum    Maternal anemia in pregnancy, antepartum    Club foot, fetal, affecting care of mother, antepartum    Pain of left calf    Dysuria      Mignon Vasques is Day 0  post-partum  Vaginal, Spontaneous  : pt is stable postpartum day 0    Dysuria: pt has noted only since removal of catheter. She notes symptoms are suggestive of early UTI for her. Will check UA, culture as needed.     Calf tenderness: doppler ordered     Plan:  Continue current care.      Parvin Marcum MD  4/9/2025  10:09 EDT

## 2025-04-09 NOTE — PLAN OF CARE
Problem: Adult Inpatient Plan of Care  Goal: Plan of Care Review  Outcome: Progressing  Flowsheets (Taken 4/9/2025 1311)  Progress: improving  Outcome Evaluation: pt vss, complains of back pain unrelieved by pain meds. frequent visits to NICU and outside in wheelchair. pt reports UTI symptoms but refused straight cath for urinealysis and culture. venous doppler ordered to rule out DVTs.  Plan of Care Reviewed With:   patient   family  Goal: Patient-Specific Goal (Individualized)  Outcome: Progressing  Goal: Absence of Hospital-Acquired Illness or Injury  Outcome: Progressing  Intervention: Identify and Manage Fall Risk  Recent Flowsheet Documentation  Taken 4/9/2025 1305 by Gill Castaneda RN  Safety Promotion/Fall Prevention: patient off unit  Taken 4/9/2025 1210 by Gill Castaneda RN  Safety Promotion/Fall Prevention: safety round/check completed  Taken 4/9/2025 1035 by Gill Castaneda RN  Safety Promotion/Fall Prevention: safety round/check completed  Taken 4/9/2025 0955 by Gill Castaneda RN  Safety Promotion/Fall Prevention: safety round/check completed  Taken 4/9/2025 0945 by Gill Castaneda RN  Safety Promotion/Fall Prevention: patient off unit  Goal: Optimal Comfort and Wellbeing  Outcome: Progressing  Intervention: Monitor Pain and Promote Comfort  Recent Flowsheet Documentation  Taken 4/9/2025 1210 by Gill Castaneda RN  Pain Management Interventions:   pain management plan reviewed with patient/caregiver   pain medication given  Taken 4/9/2025 0955 by Gill Castaneda RN  Pain Management Interventions:   pain management plan reviewed with patient/caregiver   medication offered but refused  Intervention: Provide Person-Centered Care  Recent Flowsheet Documentation  Taken 4/9/2025 0955 by Gill Castaneda RN  Trust Relationship/Rapport:   care explained   choices provided   emotional support provided   empathic listening provided   questions answered   questions encouraged    reassurance provided   thoughts/feelings acknowledged  Goal: Readiness for Transition of Care  Outcome: Progressing     Problem: Skin Injury Risk Increased  Goal: Skin Health and Integrity  Outcome: Progressing  Intervention: Optimize Skin Protection  Recent Flowsheet Documentation  Taken 4/9/2025 0955 by Gill Castaneda RN  Activity Management:   up ad shashi   ambulated in room   ambulated to bathroom   ambulated outside room     Problem: Comorbidity Management  Goal: Maintenance of Asthma Control  Outcome: Progressing  Goal: Maintenance of Behavioral Health Symptom Control  Outcome: Progressing  Goal: Maintenance of COPD Symptom Control  Outcome: Progressing  Goal: Blood Glucose Level Within Target Range  Outcome: Progressing  Goal: Maintenance of Heart Failure Symptom Control  Outcome: Progressing  Goal: Blood Pressure in Desired Range  Outcome: Progressing  Goal: Maintenance of Osteoarthritis Symptom Control  Outcome: Progressing  Intervention: Maintain Osteoarthritis Symptom Control  Recent Flowsheet Documentation  Taken 4/9/2025 0955 by Gill Castaneda RN  Activity Management:   up ad shashi   ambulated in room   ambulated to bathroom   ambulated outside room  Goal: Bariatric Home Regimen Maintained  Outcome: Progressing  Goal: Maintenance of Seizure Control  Outcome: Progressing     Problem: Anesthesia/Analgesia, Neuraxial  Goal: Safe, Effective Infusion Delivery  Outcome: Progressing  Goal: Stable Patient-Fetal Status  Outcome: Progressing  Goal: Absence of Infection Signs and Symptoms  Outcome: Progressing  Goal: Nausea and Vomiting Relief  Outcome: Progressing  Goal: Optimal Pain Control and Function  Outcome: Progressing  Intervention: Prevent or Manage Pain  Recent Flowsheet Documentation  Taken 4/9/2025 1210 by Gill Castaneda RN  Pain Management Interventions:   pain management plan reviewed with patient/caregiver   pain medication given  Taken 4/9/2025 0955 by Gill Castaneda RN  Pain  Management Interventions:   pain management plan reviewed with patient/caregiver   medication offered but refused  Goal: Effective Oxygenation and Ventilation  Outcome: Progressing  Goal: Baseline Motor Function Return  Outcome: Progressing  Intervention: Optimize Sensorimotor Function and Safety  Recent Flowsheet Documentation  Taken 4/9/2025 1305 by Gill Castaneda RN  Safety Promotion/Fall Prevention: patient off unit  Taken 4/9/2025 1210 by Gill Castaneda RN  Safety Promotion/Fall Prevention: safety round/check completed  Taken 4/9/2025 1035 by Gill Castaneda RN  Safety Promotion/Fall Prevention: safety round/check completed  Taken 4/9/2025 0955 by Gill Castaneda RN  Safety Promotion/Fall Prevention: safety round/check completed  Taken 4/9/2025 0945 by Gill Castaneda RN  Safety Promotion/Fall Prevention: patient off unit  Goal: Effective Urinary Elimination  Outcome: Progressing   Goal Outcome Evaluation:  Plan of Care Reviewed With: patient, family        Progress: improving  Outcome Evaluation: pt vss, complains of back pain unrelieved by pain meds. frequent visits to NICU and outside in wheelchair. pt reports UTI symptoms but refused straight cath for urinealysis and culture. venous doppler ordered to rule out DVTs.

## 2025-04-09 NOTE — NURSING NOTE
Called dr sher and she wanted dopplers stat. This rn called house and changed doppler order to stat.

## 2025-04-09 NOTE — ANESTHESIA POSTPROCEDURE EVALUATION
Patient: Mignon Vasques    Procedure Summary       Date: 04/08/25 Room / Location:     Anesthesia Start: 1404 Anesthesia Stop: 04/09/25 0018    Procedure: LABOR ANALGESIA Diagnosis:     Scheduled Providers:  Provider: Sumanth Coles MD    Anesthesia Type: epidural ASA Status: 3            Anesthesia Type: epidural    Vitals  Vitals Value Taken Time   /78 04/09/25 05:20   Temp 36.7 °C (98 °F) 04/09/25 05:20   Pulse 88 04/09/25 05:20   Resp 16 04/09/25 05:20   SpO2 99 % 04/09/25 00:30           Post Anesthesia Care and Evaluation    Patient location during evaluation: bedside  Patient participation: complete - patient participated  Level of consciousness: awake and alert  Pain management: adequate    Airway patency: patent  Anesthetic complications: No anesthetic complications  PONV Status: controlled  Cardiovascular status: acceptable  Respiratory status: acceptable  Hydration status: acceptable

## 2025-04-09 NOTE — PROGRESS NOTES
"Patient is comfortable with her epidural    BP 93/59   Pulse 71   Temp 97.5 °F (36.4 °C) (Oral)   Resp 16   Ht 160 cm (63\")   Wt 93.2 kg (205 lb 6.4 oz)   LMP 07/01/2024 (Approximate)   SpO2 95%   Breastfeeding Yes   BMI 36.38 kg/m²   IUPC catheter is replaced because resting tone was measuring high.  Abdomen was palpating soft between contractions  Fetal heart rate tracing with periods of decreased variability.  With scalp stimulation there is good acceleration and increased variability after  Cervix is 5 to 6 cm 70% and -1 station, bloody show is noted  Pitocin is not 6 milliunits  Contractions are every 2 to 4 minutes    Assessment-  Slow cervical change    Plan-continue Pitocin  "

## 2025-04-09 NOTE — L&D DELIVERY NOTE
[facility]   Vaginal Delivery Note    Patient Name: Mignon Vasques  : 1995  MRN: [id number]    Date of Delivery: 2025    Diagnosis     Pre & Post-Delivery:  Intrauterine pregnancy at 38w0d  Labor status: Induced Onset of Labor    Buprenorphine maintenance treatment affecting pregnancy in third trimester    Anxious mood    Lumbar radiculopathy    Protruded lumbar disc    Sciatic nerve pain    Pregnancy    Buprenorphine maintenance treatment affecting pregnancy    Tobacco use during pregnancy, antepartum    Maternal anemia in pregnancy, antepartum    Club foot, fetal, affecting care of mother, antepartum             Problem List    Transfer to Postpartum     Review the Delivery Report for details.     Delivery     Delivery: Vaginal, Spontaneous    YOB: 2024   Time of Birth:  Gestational Age 12:18 AM  38w0d     Anesthesia: Epidural    Delivering clinician: Jose Lauren   Forceps?   No   Vacuum? No    Shoulder dystocia present: No        Delivery narrative: Patient is a 29-year-old  8 para 3-0-4-3 at 38 weeks who was admitted for induction of labor due to use in pregnancy.  Group B strep was negative.  Patient was admitted and started on Pitocin.  Amniotomy revealed clear fluid.  Fetal heart rate tracing was category 1.  Patient received an epidural overall slow progress through labor and was at 5 cm for several hours.  She had periods of category 2 tracing with decreased variability.  She later had variable decelerations.  Amnioinfusion was done.  When the patient was complete she was prepped and draped for delivery.  Patient pushed with excellent effort.  Fetal head delivered and restituted to the maternal left.  Nares and mouth were bulb suctioned.  There was no nuchal cord.  Shoulders and body delivered without difficulty.  Baby was placed on mom's abdomen and after 30 seconds the cord was clamped and cut.  Cord gases and cord blood were collected.  Placenta was noted to be  Called home number again. No answer. Please continue to try to reach patient.    Please arrange visit with [facility]ja "complete.  Pitocin was started per protocol.  Fundus was firm.  Perineum and vagina were inspected and no lacerations were seen.  Fundus was rechecked and noted to be firm.  Mom and baby are recovering well.      Infant     Findings: male infant     Infant observations: Weight: No birth weight on file.  Length:   in  Observations/Comments:  Ldr 5 panda     Apgars:   @ 1 minute /      @ 5 minutes   Infant Name: Jose C     Placenta & Cord         Placenta delivered    at        Cord: 3 vessels present.   Nuchal Cord?  no   Cord blood obtained: Yes   Cord gases obtained:  Yes   Cord gas results: Venous:  No results found for: \"PHCVEN\", \"BECVEN\"    Arterial:  No results found for: \"PHCART\", \"BECART\"     Repair     Episiotomy: Not recorded    No    Lacerations: No   Estimated Blood Loss:       Quantitative Blood Loss:  69 cc        Complications     none    Disposition     Mother to Mother Baby/Postpartum  in stable condition currently.  Baby to remains with mom  in stable condition currently.    Jose Lauren MD  04/09/25  00:31 EDT        "

## 2025-04-09 NOTE — PLAN OF CARE
Goal Outcome Evaluation:  Plan of Care Reviewed With: patient        Progress: improving  Outcome Evaluation: pt transported to mother baby rom. Pt VS and fundal exam WNL. Pt pain management orders changed and pt pain being treated. infant son was transfered to NICU for transition care.  pt ambulated to bathroom to void.

## 2025-04-10 VITALS
SYSTOLIC BLOOD PRESSURE: 127 MMHG | WEIGHT: 205.4 LBS | TEMPERATURE: 97.3 F | HEART RATE: 76 BPM | DIASTOLIC BLOOD PRESSURE: 84 MMHG | RESPIRATION RATE: 18 BRPM | HEIGHT: 63 IN | BODY MASS INDEX: 36.39 KG/M2 | OXYGEN SATURATION: 99 %

## 2025-04-10 LAB
BASOPHILS # BLD AUTO: 0.02 10*3/MM3 (ref 0–0.2)
BASOPHILS NFR BLD AUTO: 0.2 % (ref 0–1.5)
BH CV LOWER VASCULAR LEFT COMMON FEMORAL AUGMENT: NORMAL
BH CV LOWER VASCULAR LEFT COMMON FEMORAL COMPETENT: NORMAL
BH CV LOWER VASCULAR LEFT COMMON FEMORAL COMPRESS: NORMAL
BH CV LOWER VASCULAR LEFT COMMON FEMORAL PHASIC: NORMAL
BH CV LOWER VASCULAR LEFT COMMON FEMORAL SPONT: NORMAL
BH CV LOWER VASCULAR LEFT DISTAL FEMORAL COMPRESS: NORMAL
BH CV LOWER VASCULAR LEFT GASTRONEMIUS COMPRESS: NORMAL
BH CV LOWER VASCULAR LEFT GREATER SAPH AK COMPRESS: NORMAL
BH CV LOWER VASCULAR LEFT GREATER SAPH BK COMPRESS: NORMAL
BH CV LOWER VASCULAR LEFT LESSER SAPH COMPRESS: NORMAL
BH CV LOWER VASCULAR LEFT MID FEMORAL AUGMENT: NORMAL
BH CV LOWER VASCULAR LEFT MID FEMORAL COMPETENT: NORMAL
BH CV LOWER VASCULAR LEFT MID FEMORAL COMPRESS: NORMAL
BH CV LOWER VASCULAR LEFT MID FEMORAL PHASIC: NORMAL
BH CV LOWER VASCULAR LEFT MID FEMORAL SPONT: NORMAL
BH CV LOWER VASCULAR LEFT PERONEAL COMPRESS: NORMAL
BH CV LOWER VASCULAR LEFT POPLITEAL AUGMENT: NORMAL
BH CV LOWER VASCULAR LEFT POPLITEAL COMPETENT: NORMAL
BH CV LOWER VASCULAR LEFT POPLITEAL COMPRESS: NORMAL
BH CV LOWER VASCULAR LEFT POPLITEAL PHASIC: NORMAL
BH CV LOWER VASCULAR LEFT POPLITEAL SPONT: NORMAL
BH CV LOWER VASCULAR LEFT POSTERIOR TIBIAL COMPRESS: NORMAL
BH CV LOWER VASCULAR LEFT PROFUNDA FEMORAL COMPRESS: NORMAL
BH CV LOWER VASCULAR LEFT PROXIMAL FEMORAL COMPRESS: NORMAL
BH CV LOWER VASCULAR LEFT SAPHENOFEMORAL JUNCTION COMPRESS: NORMAL
BH CV LOWER VASCULAR RIGHT COMMON FEMORAL AUGMENT: NORMAL
BH CV LOWER VASCULAR RIGHT COMMON FEMORAL COMPETENT: NORMAL
BH CV LOWER VASCULAR RIGHT COMMON FEMORAL COMPRESS: NORMAL
BH CV LOWER VASCULAR RIGHT COMMON FEMORAL PHASIC: NORMAL
BH CV LOWER VASCULAR RIGHT COMMON FEMORAL SPONT: NORMAL
BH CV LOWER VASCULAR RIGHT DISTAL FEMORAL COMPRESS: NORMAL
BH CV LOWER VASCULAR RIGHT GASTRONEMIUS COMPRESS: NORMAL
BH CV LOWER VASCULAR RIGHT GREATER SAPH AK COMPRESS: NORMAL
BH CV LOWER VASCULAR RIGHT GREATER SAPH BK COMPRESS: NORMAL
BH CV LOWER VASCULAR RIGHT LESSER SAPH COMPRESS: NORMAL
BH CV LOWER VASCULAR RIGHT MID FEMORAL AUGMENT: NORMAL
BH CV LOWER VASCULAR RIGHT MID FEMORAL COMPETENT: NORMAL
BH CV LOWER VASCULAR RIGHT MID FEMORAL COMPRESS: NORMAL
BH CV LOWER VASCULAR RIGHT MID FEMORAL PHASIC: NORMAL
BH CV LOWER VASCULAR RIGHT MID FEMORAL SPONT: NORMAL
BH CV LOWER VASCULAR RIGHT PERONEAL COMPRESS: NORMAL
BH CV LOWER VASCULAR RIGHT POPLITEAL AUGMENT: NORMAL
BH CV LOWER VASCULAR RIGHT POPLITEAL COMPETENT: NORMAL
BH CV LOWER VASCULAR RIGHT POPLITEAL COMPRESS: NORMAL
BH CV LOWER VASCULAR RIGHT POPLITEAL PHASIC: NORMAL
BH CV LOWER VASCULAR RIGHT POPLITEAL SPONT: NORMAL
BH CV LOWER VASCULAR RIGHT POSTERIOR TIBIAL COMPRESS: NORMAL
BH CV LOWER VASCULAR RIGHT PROFUNDA FEMORAL COMPRESS: NORMAL
BH CV LOWER VASCULAR RIGHT PROXIMAL FEMORAL COMPRESS: NORMAL
BH CV LOWER VASCULAR RIGHT SAPHENOFEMORAL JUNCTION COMPRESS: NORMAL
DEPRECATED RDW RBC AUTO: 42.1 FL (ref 37–54)
EOSINOPHIL # BLD AUTO: 0.14 10*3/MM3 (ref 0–0.4)
EOSINOPHIL NFR BLD AUTO: 1.7 % (ref 0.3–6.2)
ERYTHROCYTE [DISTWIDTH] IN BLOOD BY AUTOMATED COUNT: 12.2 % (ref 12.3–15.4)
HCT VFR BLD AUTO: 31 % (ref 34–46.6)
HGB BLD-MCNC: 10.3 G/DL (ref 12–15.9)
IMM GRANULOCYTES # BLD AUTO: 0.03 10*3/MM3 (ref 0–0.05)
IMM GRANULOCYTES NFR BLD AUTO: 0.4 % (ref 0–0.5)
LYMPHOCYTES # BLD AUTO: 2.6 10*3/MM3 (ref 0.7–3.1)
LYMPHOCYTES NFR BLD AUTO: 31.6 % (ref 19.6–45.3)
MCH RBC QN AUTO: 31.6 PG (ref 26.6–33)
MCHC RBC AUTO-ENTMCNC: 33.2 G/DL (ref 31.5–35.7)
MCV RBC AUTO: 95.1 FL (ref 79–97)
MONOCYTES # BLD AUTO: 0.82 10*3/MM3 (ref 0.1–0.9)
MONOCYTES NFR BLD AUTO: 10 % (ref 5–12)
NEUTROPHILS NFR BLD AUTO: 4.62 10*3/MM3 (ref 1.7–7)
NEUTROPHILS NFR BLD AUTO: 56.1 % (ref 42.7–76)
NRBC BLD AUTO-RTO: 0 /100 WBC (ref 0–0.2)
PLATELET # BLD AUTO: 155 10*3/MM3 (ref 140–450)
PMV BLD AUTO: 9.8 FL (ref 6–12)
RBC # BLD AUTO: 3.26 10*6/MM3 (ref 3.77–5.28)
WBC NRBC COR # BLD AUTO: 8.23 10*3/MM3 (ref 3.4–10.8)

## 2025-04-10 PROCEDURE — 85025 COMPLETE CBC W/AUTO DIFF WBC: CPT | Performed by: OBSTETRICS & GYNECOLOGY

## 2025-04-10 RX ORDER — DULOXETIN HYDROCHLORIDE 30 MG/1
30 CAPSULE, DELAYED RELEASE ORAL DAILY
Qty: 30 CAPSULE | Refills: 11 | Status: SHIPPED | OUTPATIENT
Start: 2025-04-10

## 2025-04-10 RX ORDER — LIDOCAINE 50 MG/G
1 PATCH TOPICAL EVERY 24 HOURS
Qty: 10 PATCH | Refills: 0 | Status: SHIPPED | OUTPATIENT
Start: 2025-04-10

## 2025-04-10 RX ORDER — ACETAMINOPHEN 500 MG
500 TABLET ORAL EVERY 6 HOURS PRN
Qty: 40 TABLET | Refills: 1 | Status: SHIPPED | OUTPATIENT
Start: 2025-04-10

## 2025-04-10 RX ADMIN — OXYCODONE HYDROCHLORIDE 10 MG: 10 TABLET ORAL at 06:13

## 2025-04-10 RX ADMIN — GABAPENTIN 800 MG: 400 CAPSULE ORAL at 02:18

## 2025-04-10 RX ADMIN — ACETAMINOPHEN 650 MG: 325 TABLET, FILM COATED ORAL at 10:20

## 2025-04-10 RX ADMIN — DOCUSATE SODIUM 100 MG: 100 CAPSULE, LIQUID FILLED ORAL at 10:20

## 2025-04-10 RX ADMIN — BUPRENORPHINE HYDROCHLORIDE AND NALOXONE HYDROCHLORIDE DIHYDRATE 2 TABLET: 8; 2 TABLET SUBLINGUAL at 10:20

## 2025-04-10 RX ADMIN — OXYCODONE HYDROCHLORIDE 10 MG: 10 TABLET ORAL at 00:06

## 2025-04-10 RX ADMIN — GABAPENTIN 800 MG: 400 CAPSULE ORAL at 10:20

## 2025-04-10 NOTE — PLAN OF CARE
Goal Outcome Evaluation:              Outcome Evaluation: vss, ready for dc, pain well controlled, bleeding minimal

## 2025-04-10 NOTE — DISCHARGE SUMMARY
Vaginal delivery Discharge Summary      Date of Admission: 2025    Date of Discharge:  4/10/2025    Patient: Mignon Vasques      MR#:[id number]    Surgeon/OB: Jose Lauren    Discharge Diagnosis: Vaginal Delivery at 38w0d, uncomplicated recovery    Procedures:  Vaginal, Spontaneous    2025   12:18 AM     Anesthesia:  Epidural    Presenting Problem/History of Present Illness  Buprenorphine maintenance treatment affecting pregnancy in third trimester [O99.323, F11.20]     Patient Active Problem List   Diagnosis    Back pain    Anxious mood    Asthma during pregnancy    History of depression    Lumbar radiculopathy    Protruded lumbar disc    Sciatic nerve pain    Shoulder instability, right    Maternal varicella, non-immune    Pregnancy    History of substance abuse    Buprenorphine maintenance treatment affecting pregnancy    Constipation during pregnancy in second trimester    Tobacco use during pregnancy, antepartum    Request for sterilization    Maternal anemia in pregnancy, antepartum    Club foot, fetal, affecting care of mother, antepartum    Buprenorphine maintenance treatment affecting pregnancy in third trimester    Pain of left calf    Dysuria       Hospital Course  Patient is a 29 y.o. female  at 38w0d status post vaginal delivery.    Uneventful recovery.  Patient is ambulating, tolerating a regular diet.  Perineum is intact.    Infant:   male fetus 3160 g (6 lb 15.5 oz) with Apgar scores of 8 , 8  at five minutes.    Condition on Discharge:  Stable    Vital Signs  Temp:  [97.3 °F (36.3 °C)-97.7 °F (36.5 °C)] 97.3 °F (36.3 °C)  Heart Rate:  [76-81] 76  Resp:  [16-18] 18  BP: (113-127)/(75-84) 127/84    Lab Results   Component Value Date    WBC 8.23 04/10/2025    HGB 10.3 (L) 04/10/2025    HCT 31.0 (L) 04/10/2025    MCV 95.1 04/10/2025     04/10/2025       Discharge Disposition  Home or Self Care    Discharge Medications     Discharge Medications        New Medications         Instructions Start Date   acetaminophen 500 MG tablet  Commonly known as: TYLENOL   500 mg, Oral, Every 6 Hours PRN      DULoxetine 30 MG capsule  Commonly known as: CYMBALTA   30 mg, Oral, Daily      lidocaine 5 %  Commonly known as: Lidoderm   1 patch, Transdermal, Every 24 Hours, Remove & Discard patch within 12 hours or as directed by MD             Continue These Medications        Instructions Start Date   albuterol sulfate  (90 Base) MCG/ACT inhaler  Commonly known as: PROVENTIL HFA;VENTOLIN HFA;PROAIR HFA   2 puffs, Inhalation, Every 4 Hours PRN      Brixadi 128 MG/0.36ML solution prefilled syringe  Generic drug: Buprenorphine ER   INJECT 128MG SUBCUTANEOUSLY ONCE MONTHLY      buprenorphine-naloxone 8-2 MG per SL tablet  Commonly known as: SUBOXONE   2 tablets, Daily      docusate sodium 100 MG capsule  Commonly known as: Colace   100 mg, Oral, 2 Times Daily      ferrous sulfate 325 (65 FE) MG tablet   325 mg, Oral, Daily With Breakfast      fluticasone 50 MCG/ACT nasal spray  Commonly known as: FLONASE   1 spray, Daily      gabapentin 800 MG tablet  Commonly known as: NEURONTIN   1 tablet, 3 Times Daily      pantoprazole 20 MG EC tablet  Commonly known as: PROTONIX   40 mg, Daily      PRENATAL GUMMY VITAMIN   1 each, Oral, Daily      sennosides-docusate 8.6-50 MG per tablet  Commonly known as: PERICOLACE   2 tablets, Oral, Daily               Discharge Diet: Regular    Activity at Discharge:   Activity Instructions       Pelvic Rest      Nothing in the vagina for six weeks            Follow-up Appointments  No future appointments.  Additional Instructions for the Follow-ups that You Need to Schedule       Call MD With Problems / Concerns   As directed      Monitor your bleeding and if is too heavy then please call the office or come to the hospital.   If you have fever, abdominal tenderness, or otherwise feel unwell please call or come to the hospital.  Watch for signs and symptoms of postpartum  preeclamspia which include: high blood pressure (systolic 140 or greater, diastolic 90 or greater), terrible headache not relieved with tylenol, upper abdominal pain, persistently seeing spots or flashing lights, persistent nausea and vomiting. If you have any of these please call the office or come to the hospital.    Order Comments: Monitor your bleeding and if is too heavy then please call the office or come to the hospital. If you have fever, abdominal tenderness, or otherwise feel unwell please call or come to the hospital. Watch for signs and symptoms of postpartum preeclamspia which include: high blood pressure (systolic 140 or greater, diastolic 90 or greater), terrible headache not relieved with tylenol, upper abdominal pain, persistently seeing spots or flashing lights, persistent nausea and vomiting. If you have any of these please call the office or come to the hospital.         Discharge Follow-up with Specified Provider: Follow up with your primary OB in 6 weeks   As directed      To: Follow up with your primary OB in 6 weeks                  Prenatal labs/vax: A+/I/-/- NR, s/p tdap    Aleena Keane MD  04/10/25  11:08 EDT

## 2025-04-10 NOTE — PLAN OF CARE
Goal Outcome Evaluation:              Outcome Evaluation: VSS, back pain managed with gabapentin and sergio 10, frequent visits to NICU, up ad shashi but takes wheelchair to nicu. plan of care ongoing.

## 2025-04-10 NOTE — PAYOR COMM NOTE
"Mignon Marquez BERNA (29 y.o. Female)       Date of Birth   1995    Social Security Number       Address   [address] [facility] [address]    Home Phone   [phone]    MRN   [id number]       Christian   Patient Refused    Marital Status   Single                            Admission Date   4/8/2025    Admission Type   Emergency    Admitting Provider   Jose Lauren MD    Attending Provider   Caroline Momin MD    Department, Room/Bed   [address], E356/1       Discharge Date       Discharge Disposition       Discharge Destination                                 Attending Provider: Caroline Momin MD    Allergies: Amoxicillin, Naproxen, Tape, Adhesive Tape, Benzethonium Chloride    Isolation: None   Infection: None   Code Status: CPR    Ht: 160 cm (63\")   Wt: 93.2 kg (205 lb 6.4 oz)    Admission Cmt: None   Principal Problem: Buprenorphine maintenance treatment affecting pregnancy in third trimester [O99.323,F11.20]                   Active Insurance as of 4/8/2025       Primary Coverage       Payor Plan Insurance Group Employer/Plan Group    [facility] [id number]       Payor Plan Address Payor Plan Phone Number Payor Plan Fax Number Effective Dates    [address] [address]   2/1/2023 - None Entered    [facility] [address]         Subscriber Name Subscriber Birth Date Member ID       MIGNON MARQUEZ BERNA 1995 [id number]                     Emergency Contacts        (Rel.) Home Phone Work Phone Mobile Phone    Merly Bradford (Mother) [phone] -- [phone]              Insurance Information                  [facility] [place] [place] BY [employer]ATT Phone: --    Subscriber: Mignon Marquez Subscriber#: [id number]    Group#: [id number] Precert#: --    Authorization#: AUTO 2/4 Effective Date: --          Problem List           Codes Noted - Resolved       Hospital    Pain of left calf ICD-10-CM: M79.662  ICD-9-CM: " 729.5 4/9/2025 - Present    Dysuria ICD-10-CM: R30.0  ICD-9-CM: 788.1 4/9/2025 - Present    * (Principal) Buprenorphine maintenance treatment affecting pregnancy in third trimester ICD-10-CM: O99.323, F11.20  ICD-9-CM: 648.33, 304.00, V58.69 4/8/2025 - Present    Club foot, fetal, affecting care of mother, antepartum ICD-10-CM: O35.HXX0  ICD-9-CM: 655.83 4/1/2025 - Present    Maternal anemia in pregnancy, antepartum ICD-10-CM: O99.019  ICD-9-CM: 648.23, 285.9 1/23/2025 - Present    Tobacco use during pregnancy, antepartum ICD-10-CM: O99.330  ICD-9-CM: 649.03 9/25/2024 - Present    Pregnancy ICD-10-CM: Z34.90  ICD-9-CM: V22.2 9/24/2024 - Present    Buprenorphine maintenance treatment affecting pregnancy ICD-10-CM: O99.320, F11.20  ICD-9-CM: 648.30, 304.00, V58.69 9/24/2024 - Present    Lumbar radiculopathy ICD-10-CM: M54.16  ICD-9-CM: 724.4 6/28/2018 - Present    Protruded lumbar disc ICD-10-CM: M51.26  ICD-9-CM: 722.10 6/28/2018 - Present    Anxious mood ICD-10-CM: F41.9  ICD-9-CM: 300.00 10/27/2015 - Present    Sciatic nerve pain ICD-10-CM: M54.30  ICD-9-CM: 724.3 10/27/2015 - Present       Non-Hospital    Request for sterilization ICD-10-CM: Z30.2  ICD-9-CM: V25.2 11/12/2024 - Present    History of substance abuse ICD-10-CM: F19.11  ICD-9-CM: 305.93 9/24/2024 - Present    Constipation during pregnancy in second trimester ICD-10-CM: O99.612, K59.00  ICD-9-CM: 648.93, 564.00 9/24/2024 - Present    Maternal varicella, non-immune ICD-10-CM: O09.899, Z28.39  ICD-9-CM: V49.89 9/5/2024 - Present    Asthma during pregnancy ICD-10-CM: O99.519, J45.909  ICD-9-CM: 648.93, 493.90 3/4/2021 - Present    Back pain ICD-10-CM: M54.9  ICD-9-CM: 724.5 10/19/2020 - Present    Shoulder instability, right ICD-10-CM: M25.311  ICD-9-CM: 718.81 10/19/2020 - Present    History of depression ICD-10-CM: Z86.59  ICD-9-CM: V11.8 10/3/2017 - Present        History & Physical        Jose Lauren MD at 04/08/25 0940            [place]  Obstetric History and Physical    Chief Complaint   Patient presents with    Scheduled Induction     IOL for suspected fetal damage d/t drug/ alcohol use. Patient reports active fetal movement. Denies questions or concerns        Subjective    Patient is a 29 y.o. female  currently at 37w6d, who presents for induction of labor due to Suboxone use in pregnancy.  Baby also has right clubfoot.  Patient notes that she takes 18 mg of Suboxone every morning.  She is also on gabapentin.  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      Her prenatal care is located by Suboxone use, gabapentin use, smoking, fetus with right clubfoot, anemia, and depression.  Her previous obstetric/gynecological history is noted for prior vaginal deliveries.    The following portions of the patients history were reviewed and updated as appropriate: past medical history, past surgical history, past social history, and problem list .       Prenatal Information:  Prenatal Results       Initial Prenatal Labs       Test Value Reference Range Date Time    Hemoglobin  13.5 g/dL 11.1 - 15.9 24 1433    Hematocrit  41.1 % 34.0 - 46.6 24 1433    Platelets  262 x10E3/uL 150 - 450 24 1433    Rubella IgG  2.75 index Immune >0.99 24 1433    Hepatitis B SAg  Negative  Negative 24 1433    Hepatitis C Ab  Non Reactive  Non Reactive 24 1433    RPR  Non Reactive  Non Reactive 24 1433    T. Pallidum Ab   Non Reactive  Non Reactive 25 1040    ABO  A   24 1433    Rh  Positive   24 1433    Antibody Screen  Negative  Negative 24 1433    HIV  Non Reactive  Non Reactive 24 1433    Urine Culture  <25,000 CFU/mL Mixed Jami Isolated   25 2134       Final report   24 1433    Gonorrhea  Negative  Negative 24 1507    Chlamydia  Negative  Negative 24 1507    TSH        HgB A1c   5.6 % 4.8 - 5.6 24 1433    Varicella IgG  <135 index  Immune >165 08/29/24 1433    Hemoglobinopathy Fractionation  Comment   08/29/24 1433    Hemoglobinopathy (genetic testing)        Cystic fibrosis   Negative   09/24/24 1206    Spinal muscular atrophy  Negative   09/24/24 1206    Fragile X                  Fetal testing        Test Value Reference Range Date Time    NIPT        MSAFP  *Screen Negative*   11/06/24 1256    AFP-4                  2nd and 3rd Trimester       Test Value Reference Range Date Time    Hemoglobin (repeated)  11.4 g/dL 12.0 - 15.9 04/08/25 0905       10.8 g/dL 11.1 - 15.9 01/21/25 1040    Hematocrit (repeated)  34.1 % 34.0 - 46.6 04/08/25 0905       32.9 % 34.0 - 46.6 01/21/25 1040    Platelets   171 10*3/mm3 140 - 450 04/08/25 0905       199 x10E3/uL 150 - 450 01/21/25 1040       262 x10E3/uL 150 - 450 08/29/24 1433    1 hour GTT   99 mg/dL 70 - 139 01/21/25 1040    Antibody Screen (repeated)        3rd TM syphilis scrn (repeated)  RPR         3rd TM syphilis scrn (repeated) TP-Ab  Non Reactive  Non Reactive 01/21/25 1040    3rd TM syphilis screen TB-Ab (FTA)  Non Reactive  Non Reactive 01/21/25 1040    Syphilis cascade test TP-Ab (EIA)        Syphilis cascade TPPA        GTT Fasting        GTT 1 Hr        GTT 2 Hr        GTT 3 Hr        Group B Strep  Negative  Negative 03/25/25 1136              Other testing        Test Value Reference Range Date Time    Parvo IgG         CMV IgG                   Drug Screening       Test Value Reference Range Date Time    Amphetamine Screen  Negative  Negative 04/08/25 0905       Negative ng/mL Eusjow=8251 08/29/24 1510    Barbiturate Screen  Negative  Negative 04/08/25 0905       Negative ng/mL Akaerv=087 08/29/24 1510    Benzodiazepine Screen  Negative  Negative 04/08/25 0905       Negative ng/mL Nyodqk=533 08/29/24 1510    Methadone Screen  Negative  Negative 04/08/25 0905       Negative ng/mL Pyjtec=828 08/29/24 1510    Phencyclidine Screen  Negative  Negative 04/08/25 0905       Negative ng/mL  Cutoff=25 08/29/24 1510    Opiates Screen  Negative  Negative 04/08/25 0905       Negative ng/mL Xhxorb=313 08/29/24 1510    THC Screen  Negative  Negative 04/08/25 0905       Negative ng/mL Cutoff=50 08/29/24 1510    Cocaine Screen  Negative  Negative 04/08/25 0905       Negative ng/mL Bsxhwu=529 08/29/24 1510    Propoxyphene Screen  Negative ng/mL Ukmijm=473 08/29/24 1510    Buprenorphine Screen  Positive  Negative 04/08/25 0905    Methamphetamine Screen  Negative  Negative 04/08/25 0905    Oxycodone Screen  Negative  Negative 04/08/25 0905    Tricyclic Antidepressants Screen  Negative  Negative 04/08/25 0905              Legend    ^: Historical                          External Prenatal Results       Pregnancy Outside Results - Transcribed From Office Records - See Scanned Records For Details       Test Value Date Time    ABO  A  08/29/24 1433    Rh  Positive  08/29/24 1433    Antibody Screen  Negative  08/29/24 1433    Varicella IgG  <135 index 08/29/24 1433    Rubella  2.75 index 08/29/24 1433    Hgb  11.4 g/dL 04/08/25 0905       10.8 g/dL 01/21/25 1040       13.5 g/dL 08/29/24 1433    Hct  34.1 % 04/08/25 0905       32.9 % 01/21/25 1040       41.1 % 08/29/24 1433    HgB A1c   5.6 % 08/29/24 1433    1h GTT  99 mg/dL 01/21/25 1040    3h GTT Fasting       3h GTT 1 hour       3h GTT 2 hour       3h GTT 3 hour        Gonorrhea (discrete)  Negative  08/29/24 1507    Chlamydia (discrete)  Negative  08/29/24 1507    RPR  Non Reactive  08/29/24 1433    Syphils cascade: TP-Ab (FTA)  Non Reactive  01/21/25 1040    TP-Ab  Non Reactive  01/21/25 1040    TP-Ab (EIA)       TPPA       HBsAg  Negative  08/29/24 1433    Herpes Simplex Virus PCR       Herpes Simplex VIrus Culture       HIV  Non Reactive  08/29/24 1433    Hep C RNA Quant PCR       Hep C Antibody  Non Reactive  08/29/24 1433    AFP  13.5 ng/mL 11/06/24 1256    NIPT       Cystic Fibrosis (Yahaira)  Negative  09/24/24 1206    Cystic Fibroisis        Spinal Muscular  atrophy  Negative  24 1206    Fragile X       Group B Strep  Negative  25 1136    GBS Susceptibility to Clindamycin       GBS Susceptibility to Erythromycin       Fetal Fibronectin       Genetic Testing, Maternal Blood                 Drug Screening       Test Value Date Time    Urine Drug Screen       Amphetamine Screen  Negative  25 0905       Negative ng/mL 24 1510    Barbiturate Screen  Negative  25 0905       Negative ng/mL 24 1510    Benzodiazepine Screen  Negative  25 0905       Negative ng/mL 24 1510    Methadone Screen  Negative  25 0905       Negative ng/mL 24 1510    Phencyclidine Screen  Negative  25 0905       Negative ng/mL 24 1510    Opiates Screen  Negative  25 0905    THC Screen  Negative  25 0905    Cocaine Screen       Propoxyphene Screen  Negative ng/mL 24 1510    Buprenorphine Screen  Positive  25 0905    Methamphetamine Screen       Oxycodone Screen  Negative  25 0905    Tricyclic Antidepressants Screen  Negative  25 0905              Legend    ^: Historical                             Past OB History:     OB History    Para Term  AB Living   8 3 3 0 4 3   SAB IAB Ectopic Molar Multiple Live Births   0 4 0 0 0 3      # Outcome Date GA Lbr Jabari/2nd Weight Sex Type Anes PTL Lv   8 Current            7 IAB            6 IAB            5 IAB            4 Term 18 39w3d 14:55 2745 g (6 lb 0.8 oz) F Vag-Spont EPI N KATHY      Birth Comments: del note reviewed - no comps - [facility]      Name: MIRZA MARQUEZ      Apgar1: 8  Apgar5: 9   3 IAB            2 Term 2016   3374 g (7 lb 7 oz) F Vag-Spont   KATHY      Name: josheffiesangita   1 Term 13 38w0d  3538 g (7 lb 12.8 oz) M Vag-Spont  N KATHY      Name: bashirdachelsea      Obstetric Comments   EAB x 4 - [facility]    24 - LMP rejected - IUP at 6-1 weeks - FEDERICO 25 - [facility]    24 - 20-1 weeks -normal completed anatomy other than  bilateral clubfeet - -cervical length normal=[facility]    1/7/25 - 24-6 weeks - Anterior placenta. g (50%, AC 55%)   Normal appearing fetal anatomy except suspected R club foot.  Left foot looks normal on today's US.    3/18/25 - 35-6 weeks  - EFW 39 %, AC 44% BPP 8/8 - [facility]        Past Medical History: Past Medical History:   Diagnosis Date    Abnormal Pap smear of cervix     Asthma     Back pain     Takes 1-2 Vicodin per day    Back pain     Slipped disc in L3 and L4    Cervical dysplasia     HPV (human papilloma virus) infection     Migraine       Past Surgical History Past Surgical History:   Procedure Laterality Date    CHOLECYSTECTOMY      D & C WITH SUCTION      DENTAL PROCEDURE      LAPAROSCOPIC CHOLECYSTECTOMY      NECK SURGERY  04/14/2021      Family History: Family History   Problem Relation Age of Onset    Asthma Mother     Diabetes Mother     Hypertension Mother     Ovarian cancer Maternal Grandmother     Breast cancer Neg Hx     Uterine cancer Neg Hx     Colon cancer Neg Hx       Social History:  reports that she has been smoking cigarettes. She has a 2.5 pack-year smoking history. She has never used smokeless tobacco.   reports no history of alcohol use.   reports that she does not currently use drugs after having used the following drugs: Marijuana.        General ROS:  Notes good fetal movements, no vaginal bleeding or loss of fluid.    Objective      Vital Signs Range for the last 24 hours  Temperature: Temp:  [97.8 °F (36.6 °C)] 97.8 °F (36.6 °C)   Temp Source: Temp src: Oral   BP: BP: (112)/(63) 112/63   Pulse: Heart Rate:  [91] 91   Respirations: Resp:  [18] 18   SPO2:     O2 Amount (l/min):     O2 Devices     Weight: Weight:  [93.2 kg (205 lb 6.4 oz)] 93.2 kg (205 lb 6.4 oz)     Physical Examination: General appearance - alert, well appearing, and in no distress  Mental status - normal mood, behavior, speech, dress, motor activity, and thought processes  Eyes - sclera anicteric  Abdomen -obese,  gravid, size equal dates  Pelvic -cervix is 3 cm 60% posterior and -2 station    Presentation: vertex   Cervix: Exam by:     Dilation: Cervical Dilation (cm): 2-3   Effacement: Cervical Effacement: 60   Station:         Fetal Heart Rate Assessment   Method:     Beats/min:     Baseline:     Variability:     Accels:     Decels:     Tracing Category:       Uterine Assessment   Method:     Frequency (min):     Ctx Count in 10 min:     Duration:     Intensity:     Intensity by IUPC:     Resting Tone:     Resting Tone by IUPC:     [place] Units:       Laboratory Results:   Results from last 7 days   Lab Units 04/08/25  0905   WBC 10*3/mm3 9.33   HEMOGLOBIN g/dL 11.4*   HEMATOCRIT % 34.1   PLATELETS 10*3/mm3 171     Results from last 7 days   Lab Units 04/08/25  0905   SODIUM mmol/L 138   POTASSIUM mmol/L 3.4*   CHLORIDE mmol/L 104   CO2 mmol/L 21.7*   BUN mg/dL 7   CREATININE mg/dL 0.55*   CALCIUM mg/dL 9.0   BILIRUBIN mg/dL <0.2   ALK PHOS U/L 178*   ALT (SGPT) U/L 11   AST (SGOT) U/L 15   GLUCOSE mg/dL 137*       Other Studies: Most recent fetal weight 3 weeks ago showed an estimated fetal weight at the 32nd percentile.  Abdominal circumference at the 49th percentile.    Assessment & Plan      Buprenorphine maintenance treatment affecting pregnancy in third trimester    Anxious mood    Pregnancy    Buprenorphine maintenance treatment affecting pregnancy    Tobacco use during pregnancy, antepartum    Maternal anemia in pregnancy, antepartum    Club foot, fetal, affecting care of mother, antepartum        Assessment:  1.  Intrauterine pregnancy at 37w6d gestation with reactive, reassuring fetal status.    2.  Duction of labor for Suboxone use-cervix is favorable.  3.  Obstetrical history significant for  Suboxone use, gabapentin use, smoking, fetal clubfoot on the right, anemia and depression .  4.  GBS status:   Strep Gp B MORAIMA   Date Value Ref Range Status   03/25/2025 Negative Negative Final     Comment:     [facility]  for Disease Control and Prevention (CDC) and American Congress  of Obstetricians and Gynecologists (ACOG) guidelines for prevention of   group B streptococcal (GBS) disease specify co-collection of  a vaginal and rectal swab specimen to maximize sensitivity of GBS  detection. Per the CDC and ACOG, swabbing both the lower vagina and  rectum substantially increases the yield of detection compared with  sampling the vagina alone.  Penicillin G, ampicillin, or cefazolin are indicated for intrapartum  prophylaxis of  GBS colonization. Reflex susceptibility  testing should be performed prior to use of clindamycin only on GBS  isolates from penicillin-allergic women who are considered a high risk  for anaphylaxis. Treatment with vancomycin without additional testing  is warranted if resistance to clindamycin is noted.         Plan:  1. fetal and uterine monitoring  continuously, labor augmentation  Pitocin, and analgesia with  epidural  2. Plan of care has been reviewed with patient and her partner  3.  Risks, benefits of treatment plan have been discussed.  4.  All questions have been answered.        Jose Lauren MD  2025  09:40 EDT      Electronically signed by Jose Lauren MD at 25 0946       Facility-Administered Medications as of 4/10/2025   Medication Dose Route Frequency Provider Last Rate Last Admin    [COMPLETED] acetaminophen (TYLENOL) tablet 1,000 mg  1,000 mg Oral Once Jose Lauren MD   1,000 mg at 25 0114    acetaminophen (TYLENOL) tablet 650 mg  650 mg Oral Q6H PRN Jose Lauren MD   650 mg at 04/10/25 1020    albuterol sulfate HFA (PROVENTIL HFA;VENTOLIN HFA;PROAIR HFA) inhaler 2 puff  2 puff Inhalation Q4H PRN Jose Lauren MD        benzocaine (AMERICAINE) 20 % rectal ointment   Rectal PRN Jose Lauren MD        benzocaine-menthol (DERMOPLAST) 20-0.5 % topical spray   Topical PRN Jose Lauren MD        bisacodyl (DULCOLAX) suppository 10 mg  10 mg Rectal Daily PRN Jose Lauren MD         buprenorphine-naloxone (SUBOXONE) 8-2 MG per SL tablet 2 tablet  2 tablet Sublingual Daily Jose Lauren MD   2 tablet at 04/10/25 1020    carboprost (HEMABATE) injection 250 mcg  250 mcg Intramuscular Q15 Min PRN Caroline Momin MD        diphenhydrAMINE (BENADRYL) capsule 25 mg  25 mg Oral Nightly PRN Jose Lauren MD        docusate sodium (COLACE) capsule 100 mg  100 mg Oral BID Jose Lauren MD   100 mg at 04/10/25 1020    fluticasone (FLONASE) 50 MCG/ACT nasal spray 1 spray  1 spray Nasal Daily Jose Lauren MD        gabapentin (NEURONTIN) capsule 800 mg  800 mg Oral Q6H Jose Lauren MD   800 mg at 04/10/25 1020    Hydrocort-Pramoxine (Perianal) (PROCTOFOAM-HS) 1-1 % rectal foam 1 Application  1 Application Topical PRN Jose Lauren MD        Hydrocortisone (Perianal) (ANUSOL-HC) 2.5 % rectal cream   Rectal PRN Jose Lauren MD        [] lactated ringers bolus 1,000 mL  1,000 mL Intravenous Once PRN Caroline Momin MD        [] lactated ringers infusion  125 mL/hr Intravenous Continuous Caroline Momin MD   Stopped at 25 2326    magnesium hydroxide (MILK OF MAGNESIA) suspension 10 mL  10 mL Oral Daily PRN Jose Lauren MD        ondansetron (ZOFRAN) injection 4 mg  4 mg Intravenous Q6H PRN Jose Lauren MD        oxyCODONE (ROXICODONE) immediate release tablet 10 mg  10 mg Oral Q6H PRN Parvin Marcum MD   10 mg at 04/10/25 0613    [] oxytocin (PITOCIN) 30 units in 0.9% sodium chloride 500 mL (premix)  250 mL/hr Intravenous Continuous Caroline Momin MD   Stopped at 25 0135    [COMPLETED] oxytocin (PITOCIN) 30 units in 0.9% sodium chloride 500 mL (premix)  125 mL/hr Intravenous Once PRN Jose Lauren  mL/hr at 25 0135 125 mL/hr at 25 0135    [COMPLETED] sodium chloride 0.9 % bolus 300 mL  300 mL Intrauterine Once Jose Lauren  mL/hr at 25 2326 300 mL at 25 2326    tranexamic acid 1000 mg in 100 mL 0.7% NaCl infusion (premix)  1,000  mg Intravenous Once PRN Caroline Momin MD         Lab Results (last 72 hours)       Procedure Component Value Units Date/Time    CBC & Differential [[id number]]  (Abnormal) Collected: 04/10/25 0820    Specimen: Blood Updated: 04/10/25 0853    Narrative:      The following orders were created for panel order CBC & Differential.  Procedure                               Abnormality         Status                     ---------                               -----------         ------                     CBC Auto Differential[[id number]]        Abnormal            Final result                 Please view results for these tests on the individual orders.    CBC Auto Differential [[id number]]  (Abnormal) Collected: 04/10/25 0820    Specimen: Blood Updated: 04/10/25 0853     WBC 8.23 10*3/mm3      RBC 3.26 10*6/mm3      Hemoglobin 10.3 g/dL      Hematocrit 31.0 %      MCV 95.1 fL      MCH 31.6 pg      MCHC 33.2 g/dL      RDW 12.2 %      RDW-SD 42.1 fl      MPV 9.8 fL      Platelets 155 10*3/mm3      Neutrophil % 56.1 %      Lymphocyte % 31.6 %      Monocyte % 10.0 %      Eosinophil % 1.7 %      Basophil % 0.2 %      Immature Grans % 0.4 %      Neutrophils, Absolute 4.62 10*3/mm3      Lymphocytes, Absolute 2.60 10*3/mm3      Monocytes, Absolute 0.82 10*3/mm3      Eosinophils, Absolute 0.14 10*3/mm3      Basophils, Absolute 0.02 10*3/mm3      Immature Grans, Absolute 0.03 10*3/mm3      nRBC 0.0 /100 WBC     Blood Gas, Arterial, Cord [[id number]]  (Abnormal) Collected: 04/09/25 0048    Specimen: Cord Blood Arterial from Umbilical Cord Updated: 04/09/25 0050     Site --     Comment: N/A        pH, Cord Arterial 7.34 pH Units      Comment: Serial Number: [id number]:  [id number]        pCO2, Cord Arterial 47.2 mmHg      pO2, Cord Arterial 30.8 mmHg      HCO3, Cord Arterial 25.6 mmol/L      Base Exc, Cord Arterial -0.8 mmol/L      O2 Sat, Cord Arterial 54.7 %      Barometric Pressure for Blood Gas 755.0000 mmHg      Modality Room  Air     Device Comment collected by [email or web address]    Blood Gas, Venous, Cord [[id number]] Collected: 04/09/25 0035    Specimen: Cord Blood Venous from Umbilical Cord Updated: 04/09/25 0037     Site --     Comment: N/A        pH, Cord Venous 7.370 pH Units      Comment: Serial Number: [id number]:  [id number]        pCO2, Cord Venous 39.7 mm Hg      pO2, Cord Venous 35.8 mm Hg      HCO3, Cord Venous 23.0 mmol/L      Base Excess, Cord Venous -2.1 mmol/L      O2 Sat, Cord Venous --     Comment: Direct O2 saturation result not reported at this site.        Barometric Pressure for Blood Gas 753.9000 mmHg      Modality Room Air     Device Comment collected by [email or web address]    Fentanyl, Urine - Urine, Clean Catch [[id number]]  (Normal) Collected: 04/08/25 1305    Specimen: Urine, Clean Catch Updated: 04/08/25 1428     Fentanyl, Urine Negative    Narrative:      Negative Threshold:      Fentanyl 5 ng/mL     The normal value for the drug tested is negative. This report includes final unconfirmed screening results to be used for medical treatment purposes only. Unconfirmed results must not be used for non-medical purposes such as employment or legal testing. Clinical consideration should be applied to any drug of abuse test, particularly when unconfirmed results are used.           Fentanyl, Urine - Urine, Clean Catch [[id number]]  (Normal) Collected: 04/08/25 0905    Specimen: Urine, Clean Catch Updated: 04/08/25 1426     Fentanyl, Urine Negative    Narrative:      Negative Threshold:      Fentanyl 5 ng/mL     The normal value for the drug tested is negative. This report includes final unconfirmed screening results to be used for medical treatment purposes only. Unconfirmed results must not be used for non-medical purposes such as employment or legal testing. Clinical consideration should be applied to any drug of abuse test, particularly when unconfirmed results are used.           Treponema pallidum AB w/Reflex RPR [[id number]]   (Normal) Collected: 04/08/25 0905    Specimen: Blood Updated: 04/08/25 1423     Treponemal AB Total Non-Reactive    Narrative:      Reactive results will reflex RPR testing.    Urine Drug Screen - Urine, Clean Catch [[id number]]  (Abnormal) Collected: 04/08/25 1305    Specimen: Urine, Clean Catch Updated: 04/08/25 1337     THC, Screen, Urine Negative     Phencyclidine (PCP), Urine Negative     Cocaine Screen, Urine Negative     Methamphetamine, Ur Negative     Opiate Screen Negative     Amphetamine Screen, Urine Negative     Benzodiazepine Screen, Urine Negative     Tricyclic Antidepressants Screen Negative     Methadone Screen, Urine Negative     Barbiturates Screen, Urine Negative     Oxycodone Screen, Urine Negative     Buprenorphine, Screen, Urine Positive    Narrative:      Cutoff For Drugs Screened:    Amphetamines               500 ng/ml  Barbiturates               200 ng/ml  Benzodiazepines            150 ng/ml  Cocaine                    150 ng/ml  Methadone                  200 ng/ml  Opiates                    100 ng/ml  Phencyclidine               25 ng/ml  THC                         50 ng/ml  Methamphetamine            500 ng/ml  Tricyclic Antidepressants  300 ng/ml  Oxycodone                  100 ng/ml  Buprenorphine               10 ng/ml    The normal value for all drugs tested is negative. This report includes unconfirmed screening results, with the cutoff values listed, to be used for medical treatment purposes only.  Unconfirmed results must not be used for non-medical purposes such as employment or legal testing.  Clinical consideration should be applied to any drug of abuse test, particularly when unconfirmed results are used.      Buprenorphine, ToxAssure, UR - Urine, Clean Catch [[id number]] Collected: 04/08/25 1305    Specimen: Urine, Clean Catch Updated: 04/08/25 1337    Comprehensive Metabolic Panel [[id number]]  (Abnormal) Collected: 04/08/25 0905    Specimen: Blood Updated: 04/08/25 0939      Glucose 137 mg/dL      BUN 7 mg/dL      Creatinine 0.55 mg/dL      Sodium 138 mmol/L      Potassium 3.4 mmol/L      Chloride 104 mmol/L      CO2 21.7 mmol/L      Calcium 9.0 mg/dL      Total Protein 6.6 g/dL      Albumin 3.4 g/dL      ALT (SGPT) 11 U/L      AST (SGOT) 15 U/L      Alkaline Phosphatase 178 U/L      Total Bilirubin <0.2 mg/dL      Globulin 3.2 gm/dL      A/G Ratio 1.1 g/dL      BUN/Creatinine Ratio 12.7     Anion Gap 12.3 mmol/L      eGFR 128.2 mL/min/1.73     Narrative:      GFR Categories in Chronic Kidney Disease (CKD)      GFR Category          GFR (mL/min/1.73)    Interpretation  G1                     90 or greater         Normal or high (1)  G2                      60-89                Mild decrease (1)  G3a                   45-59                Mild to moderate decrease  G3b                   30-44                Moderate to severe decrease  G4                    15-29                Severe decrease  G5                    14 or less           Kidney failure          (1)In the absence of evidence of kidney disease, neither GFR category G1 or G2 fulfill the criteria for CKD.    eGFR calculation 2021 CKD-EPI creatinine equation, which does not include race as a factor    Urine Drug Screen - Urine, Clean Catch [[id number]]  (Abnormal) Collected: 04/08/25 0905    Specimen: Urine, Clean Catch Updated: 04/08/25 0934     THC, Screen, Urine Negative     Phencyclidine (PCP), Urine Negative     Cocaine Screen, Urine Negative     Methamphetamine, Ur Negative     Opiate Screen Negative     Amphetamine Screen, Urine Negative     Benzodiazepine Screen, Urine Negative     Tricyclic Antidepressants Screen Negative     Methadone Screen, Urine Negative     Barbiturates Screen, Urine Negative     Oxycodone Screen, Urine Negative     Buprenorphine, Screen, Urine Positive    Narrative:      Cutoff For Drugs Screened:    Amphetamines               500 ng/ml  Barbiturates               200 ng/ml  Benzodiazepines             150 ng/ml  Cocaine                    150 ng/ml  Methadone                  200 ng/ml  Opiates                    100 ng/ml  Phencyclidine               25 ng/ml  THC                         50 ng/ml  Methamphetamine            500 ng/ml  Tricyclic Antidepressants  300 ng/ml  Oxycodone                  100 ng/ml  Buprenorphine               10 ng/ml    The normal value for all drugs tested is negative. This report includes unconfirmed screening results, with the cutoff values listed, to be used for medical treatment purposes only.  Unconfirmed results must not be used for non-medical purposes such as employment or legal testing.  Clinical consideration should be applied to any drug of abuse test, particularly when unconfirmed results are used.      Buprenorphine, ToxAssure, UR - Urine, Clean Catch [[id number]] Collected: 04/08/25 0905    Specimen: Urine, Clean Catch Updated: 04/08/25 0934    CBC & Differential [[id number]]  (Abnormal) Collected: 04/08/25 0905    Specimen: Blood Updated: 04/08/25 0924    Narrative:      The following orders were created for panel order CBC & Differential.  Procedure                               Abnormality         Status                     ---------                               -----------         ------                     CBC Auto Differential[[id number]]        Abnormal            Final result                 Please view results for these tests on the individual orders.    CBC Auto Differential [[id number]]  (Abnormal) Collected: 04/08/25 0905    Specimen: Blood Updated: 04/08/25 0924     WBC 9.33 10*3/mm3      RBC 3.53 10*6/mm3      Hemoglobin 11.4 g/dL      Hematocrit 34.1 %      MCV 96.6 fL      MCH 32.3 pg      MCHC 33.4 g/dL      RDW 12.8 %      RDW-SD 45.4 fl      MPV 9.4 fL      Platelets 171 10*3/mm3      Neutrophil % 66.3 %      Lymphocyte % 23.4 %      Monocyte % 8.3 %      Eosinophil % 1.1 %      Basophil % 0.3 %      Immature Grans % 0.6 %      Neutrophils,  "Absolute 6.19 10*3/mm3      Lymphocytes, Absolute 2.18 10*3/mm3      Monocytes, Absolute 0.77 10*3/mm3      Eosinophils, Absolute 0.10 10*3/mm3      Basophils, Absolute 0.03 10*3/mm3      Immature Grans, Absolute 0.06 10*3/mm3      nRBC 0.0 /100 WBC           Imaging Results (Last 72 Hours)       ** No results found for the last 72 hours. **          ECG/EMG Results (last 72 hours)       ** No results found for the last 72 hours. **          Orders (last 72 hrs)        Start     Ordered    04/10/25 0400  CBC & Differential  Once         04/09/25 0413    04/10/25 0400  CBC Auto Differential  PROCEDURE ONCE         04/09/25 2000    04/10/25 0000  bisacodyl (DULCOLAX) suppository 10 mg  Daily PRN         04/09/25 0413    04/09/25 1914  Inpatient Case Management  Consult  Once        Provider:  (Not yet assigned)    04/09/25 1914    04/09/25 1847  Duplex Venous Lower Extremity - Bilateral CAR  Once         04/09/25 1847    04/09/25 1658  oxyCODONE (ROXICODONE) immediate release tablet 10 mg  Every 6 Hours PRN         04/09/25 1658    04/09/25 1007  Duplex Venous Lower Extremity - Bilateral CAR  Once,   Status:  Canceled         04/09/25 1006    04/09/25 1003  Urinalysis With Culture If Indicated - Straight Cath  Once         04/09/25 1003    04/09/25 0900  buprenorphine-naloxone (SUBOXONE) 8-2 MG per SL tablet 2 tablet  Daily         04/09/25 0413    04/09/25 0900  fluticasone (FLONASE) 50 MCG/ACT nasal spray 1 spray  Daily         04/09/25 0413    04/09/25 0900  docusate sodium (COLACE) capsule 100 mg  2 Times Daily         04/09/25 0413    04/09/25 0747  ibuprofen (ADVIL,MOTRIN) tablet 600 mg  Every 6 Hours PRN,   Status:  Discontinued         04/09/25 0747    04/09/25 0615  oxytocin (PITOCIN) 30 units in 0.9% sodium chloride 500 mL (premix)  Continuous        Placed in \"Followed by\" Linked Group    04/08/25 2350    04/09/25 0600  oxytocin (PITOCIN) 30 units in 0.9% sodium chloride 500 mL (premix)  " "Once,   Status:  Discontinued        Placed in \"Followed by\" Linked Group    04/08/25 2350    04/09/25 0600  gabapentin (NEURONTIN) capsule 800 mg  Every 8 Hours Scheduled,   Status:  Discontinued         04/09/25 0117    04/09/25 0600  ibuprofen (ADVIL,MOTRIN) tablet 600 mg  Every 6 Hours Scheduled,   Status:  Discontinued         04/09/25 0247    04/09/25 0413  tranexamic acid 1000 mg in 100 mL 0.7% NaCl infusion (premix)  Once As Needed         04/09/25 0413    04/09/25 0413  carboprost (HEMABATE) injection 250 mcg  Every 15 Minutes PRN         04/09/25 0413    04/09/25 0413  Transfer Patient  Once         04/09/25 0413    04/09/25 0413  Code Status and Medical Interventions: CPR (Attempt to Resuscitate); Full  Continuous         04/09/25 0413    04/09/25 0413  Vital Signs Per hospital policy  Per Hospital Policy/Protocol         04/09/25 0413    04/09/25 0413  Notify Provider  Continuous        Comments: Open Order Report to View Parameters Requiring Provider Notification    04/09/25 0413    04/09/25 0413  Up Ad Johana  Until Discontinued         04/09/25 0413    04/09/25 0413  Ambulate Patient  Every Shift       04/09/25 0413    04/09/25 0413  Patient May Shower  Once         04/09/25 0413    04/09/25 0413  Diet: Regular/House; Fluid Consistency: Thin (IDDSI 0)  Diet Effective Now         04/09/25 0413    04/09/25 0413  Advance Diet As Tolerated -Regular  Until Discontinued         04/09/25 0413    04/09/25 0413  Fundal and Lochia Check  Per Hospital Policy/Protocol        Comments: Q 15 min x 4, Q 30 min x 2, then Q Shift    04/09/25 0413    04/09/25 0413  RN to Assess Rh Status & Place RhIG Evaluation Order if Indicated  Continuous         04/09/25 0413    04/09/25 0413  Bladder Assessment  Per Order Details        Comments: Postpartum 1) Upon Admission to Unit & Every 4 Hours PRN Until Voiding. 2) Out of Bed to Void in 8 Hours.    04/09/25 0413    04/09/25 0413  Straight Cath  Per Order Details        Comments: " Postpartum: If Distended & Unable to Void, May Repeat Once.    04/09/25 0413    04/09/25 0413  Indwelling Urinary Catheter  Per Order Details        Comments: Postpartum : After Straight Cathed x2 or if Greater Than 1000mL Residual, Insert Indwelling Urinary Catheter Until Further MD Order.    04/09/25 0413    04/09/25 0413  Breast pump to bed  Once         04/09/25 0413    04/09/25 0413  If indicated -- Please administer RH Immunoglobulin based on results of cord blood evaluation and fetal screen lab tests, pharmacy to dispense  Continuous        Comments: See process instructions for reference range details.    04/09/25 0413    04/09/25 0413  VTE Prophylaxis Not Indicated: Low Risk; Obesity - BMI >30 (1)  Once         04/09/25 0413    04/09/25 0412  albuterol sulfate HFA (PROVENTIL HFA;VENTOLIN HFA;PROAIR HFA) inhaler 2 puff  Every 4 Hours PRN         04/09/25 0413    04/09/25 0412  acetaminophen (TYLENOL) tablet 650 mg  Every 6 Hours PRN         04/09/25 0413    04/09/25 0412  diphenhydrAMINE (BENADRYL) capsule 25 mg  Nightly PRN         04/09/25 0413    04/09/25 0412  magnesium hydroxide (MILK OF MAGNESIA) suspension 10 mL  Daily PRN         04/09/25 0413    04/09/25 0412  benzocaine-menthol (DERMOPLAST) 20-0.5 % topical spray  As Needed         04/09/25 0413    04/09/25 0412  Hydrocort-Pramoxine (Perianal) (PROCTOFOAM-HS) 1-1 % rectal foam 1 Application  As Needed         04/09/25 0413    04/09/25 0412  Hydrocortisone (Perianal) (ANUSOL-HC) 2.5 % rectal cream  As Needed         04/09/25 0413    04/09/25 0412  benzocaine (AMERICAINE) 20 % rectal ointment  As Needed         04/09/25 0413    04/09/25 0412  ondansetron (ZOFRAN) injection 4 mg  Every 6 Hours PRN         04/09/25 0413    04/09/25 0332  Blood Gas, Arterial -  PROCEDURE ONCE,   Status:  Canceled         04/09/25 0327    04/09/25 0319  oxyCODONE (ROXICODONE) immediate release tablet 10 mg  Every 8 Hours PRN,   Status:  Discontinued         04/09/25 4996  "   04/09/25 0304  HYDROcodone-acetaminophen (NORCO) 5-325 MG per tablet 1 tablet  Every 4 Hours PRN,   Status:  Discontinued        Placed in \"Or\" Linked Group    04/09/25 0304    04/09/25 0304  HYDROcodone-acetaminophen (NORCO)  MG per tablet 1 tablet  Every 4 Hours PRN,   Status:  Discontinued        Placed in \"Or\" Linked Group    04/09/25 0304    04/09/25 0300  ibuprofen (ADVIL,MOTRIN) tablet 600 mg  Every 6 Hours Scheduled,   Status:  Discontinued         04/09/25 0249    04/09/25 0230  gabapentin (NEURONTIN) capsule 800 mg  Every 6 Hours         04/09/25 0130    04/09/25 0200  acetaminophen (TYLENOL) tablet 1,000 mg  Once         04/09/25 0109    04/09/25 0112  Transfer to postpartum when discharge criteria met.  Until Discontinued         04/09/25 0111    04/09/25 0111  oxytocin (PITOCIN) 30 units in 0.9% sodium chloride 500 mL (premix)  Once As Needed         04/09/25 0111    04/09/25 0030  lactated ringers infusion  Continuous,   Status:  Discontinued         04/08/25 2333    04/09/25 0022  Specimen To Pathology  Once,   Status:  Canceled         04/09/25 0021    04/09/25 0021  Blood Gas, Arterial, Cord  STAT         04/09/25 0021    04/09/25 0021  Blood Gas, Venous, Cord  Once         04/09/25 0021    04/09/25 0015  sodium chloride 0.9 % bolus 300 mL  Once         04/08/25 2320 04/09/25 0015  lactated ringers infusion  Continuous,   Status:  Discontinued         04/08/25 2325 04/08/25 2351  Notify Provider (Specified)  Until Discontinued,   Status:  Canceled         04/08/25 2350 04/08/25 2351  Vital Signs Per Hospital Policy  Per Hospital Policy/Protocol,   Status:  Canceled         04/08/25 2350 04/08/25 2351  Fundal & Lochia Check  Per Order Details,   Status:  Canceled        Comments: Every 15 Minutes x4, Then Every 30 Minutes x2, Then Every Shift    04/08/25 2350 04/08/25 2351  Diet: Regular/House; Fluid Consistency: Thin (IDDSI 0)  Diet Effective Now,   Status:  Canceled         " 04/08/25 2350 04/08/25 2351  Advance Diet As Tolerated -  Until Discontinued,   Status:  Canceled         04/08/25 2350 04/08/25 2350  Fundal & Lochia Check  Every Shift,   Status:  Canceled       04/08/25 2350 04/08/25 2350  methylergonovine (METHERGINE) injection 200 mcg  Once As Needed,   Status:  Discontinued         04/08/25 2350 04/08/25 2350  miSOPROStol (CYTOTEC) tablet 800 mcg  Once As Needed,   Status:  Discontinued         04/08/25 2350 04/08/25 2320  Amnioinfusion Through [facility]  Once,   Status:  Canceled         04/08/25 2320 04/08/25 1338  Buprenorphine, ToxAssure, UR - Urine, Clean Catch  Once         04/08/25 1337    04/08/25 1319  Fentanyl, Urine - Urine, Clean Catch  Once         04/08/25 1318    04/08/25 1304  Urine Drug Screen - Urine, Clean Catch  Once         04/08/25 1303    04/08/25 1200  Vital Signs q 4 while awake  Every 4 Hours,   Status:  Canceled      Comments: While the patient is awake.    04/08/25 0847    04/08/25 0945  sodium chloride 0.9 % flush 10 mL  Every 12 Hours Scheduled,   Status:  Discontinued         04/08/25 0847    04/08/25 0945  lactated ringers infusion  Continuous         04/08/25 0847    04/08/25 0945  oxytocin (PITOCIN) 30 units in 0.9% sodium chloride 500 mL (premix)  Titrated,   Status:  Discontinued         04/08/25 0847    04/08/25 0945  fentaNYL 2mcg/mL and ropivacaine 0.2% in NS epidural 100mL  Continuous,   Status:  Discontinued         04/08/25 0854    04/08/25 0935  Buprenorphine, ToxAssure, UR - Urine, Clean Catch  Once         04/08/25 0934    04/08/25 0917  Fentanyl, Urine - Urine, Clean Catch  Once         04/08/25 0916    04/08/25 0855  Vital Signs Per Anesthesia Guidelines  Per Order Details,   Status:  Canceled        Comments: Every 2 Minutes x5, Every 5 Minutes x4, Then If Stable Every 15 Minutes    04/08/25 0854    04/08/25 0855  Start IV with #16 or #18 gauge angiocath.  Once,   Status:  Canceled         04/08/25 0854    04/08/25  "0855  Fetal Heart Rate Monitor  Once,   Status:  Canceled         04/08/25 0854    04/08/25 0855  Nurse or anesthesiologist to remain with patient for 15 minutes following dosing.  Until Discontinued,   Status:  Canceled         04/08/25 0854    04/08/25 0855  Facilitate maternal postion on side and maintain uterine displacement.  Until Discontinued,   Status:  Canceled         04/08/25 0854    04/08/25 0855  Consult anesthesia services prior to changing epidural infusion/rate.  Until Discontinued,   Status:  Canceled         04/08/25 0854    04/08/25 0855  Nurse may remove epidural catheter after delivery.  Until Discontinued,   Status:  Canceled         04/08/25 0854    04/08/25 0855  Insert Indwelling Urinary Catheter  Once,   Status:  Canceled        Placed in \"And\" Linked Group    04/08/25 0854 04/08/25 0855  Assess Need for Indwelling Urinary Catheter - Follow Removal Protocol  Continuous,   Status:  Canceled        Comments: Indwelling Urinary Catheter Removal Criteria  Discontinue Indwelling Urinary Catheter Unless One of the Following is Present:  Urinary Retention or Obstruction  Chronic Urinary Catheter Use  End of Life  Critical Illness with Strict I/O   Tract or Abdominal Surgery  Stage 3/4 Sacral / Perineal Wound  Required Activity Restriction: Trauma  Required Activity Restriction: Spine Surgery  If Patient is Being Followed by Urology Contact Them PRIOR to Removal  Do Not Remove Indwelling Urinary Catheter Order is Present with a CLINICAL REASON to Maintain the Catheter. Provider is Required to Include a Clinical Reason to Maintain a Urinary Catheter    Patient Admitted With Indwelling Urinary Catheter (Not Placed at [facility])  Assess for Continued Need & Document Medical Necessity  If Infection is Suspected, Contact the Provider       Placed in \"And\" Linked Group    04/08/25 0854    04/08/25 0855  Urinary Catheter Care  Every Shift,   Status:  Canceled      Placed in \"And\" Linked Group "    04/08/25 0854    04/08/25 0855  Notify physician for the following conditions:  Until Discontinued,   Status:  Canceled         04/08/25 0854    04/08/25 0854  ePHEDrine injection 5 mg  Every 15 Minutes PRN,   Status:  Discontinued         04/08/25 0854    04/08/25 0854  ondansetron (ZOFRAN) injection 4 mg  Once As Needed,   Status:  Discontinued         04/08/25 0854    04/08/25 0854  famotidine (PEPCID) injection 20 mg  Once As Needed,   Status:  Discontinued         04/08/25 0854    04/08/25 0854  diphenhydrAMINE (BENADRYL) injection 12.5 mg  Every 8 Hours PRN,   Status:  Discontinued         04/08/25 0854    04/08/25 0849  Urine Drug Screen - Urine, Clean Catch  Once         04/08/25 0849    04/08/25 0848  Admit To Obstetrics Inpatient  Once         04/08/25 0847    04/08/25 0848  Code Status and Medical Interventions: CPR (Attempt to Resuscitate); Full Support  Continuous,   Status:  Canceled         04/08/25 0847    04/08/25 0848  Obtain Informed Consent  Once,   Status:  Canceled         04/08/25 0847    04/08/25 0848  Vital Signs Per Hospital Policy  Per Hospital Policy/Protocol,   Status:  Canceled         04/08/25 0847    04/08/25 0848  Mini-Prep Prior to Delivery  Once,   Status:  Canceled         04/08/25 0847    04/08/25 0848  Continuous Fetal Monitoring With NST on Admission and Prior to Initiation of Oxytocin.  Per Order Details,   Status:  Canceled        Comments: Continuous Fetal Monitoring With NST on Admission & Prior to Initiation of Oxytocin.    04/08/25 0847    04/08/25 0848  External Uterine Contraction Monitoring  Per Hospital Policy/Protocol,   Status:  Canceled         04/08/25 0847    04/08/25 0848  Notify Provider (Specified)  Until Discontinued,   Status:  Canceled         04/08/25 0847    04/08/25 0848  Notify Provider of Tachysystole (Per Hospital Algorithm)  Until Discontinued,   Status:  Canceled         04/08/25 0847    04/08/25 0848  Notify Provider if Membranes Ruptured,  Bleeding Greater Than 1 Pad Per Hour, Fetal Heart Tone Abnormality or Severe Pain  Until Discontinued,   Status:  Canceled         25 0847    25 0848  Initiate Group Beta Strep (GBS) Prophylaxis Protocol, If Criteria Met  Continuous,   Status:  Canceled        Comments: NO TREATMENT RECOMMENDED IF: 1) Maternal GBS Status Known Negative 2) Scheduled  Birth With Intact Membranes, Not in Labor 3) Maternal GBS Status Unknown, No Risk Factors  TREAT WITH ANTIBIOTICS IF:  1) Maternal GBS Status Known Positive 2) Maternal GBS Status Unknown With Risk Factors: a)  Previous Infant Affected By GBS Infection b) GBS Urinary Tract Infection (UTI) or Bacteriuria During Pregnancy c) Unexplained Maternal Fever (100.4F (38C) or Greater) During Labor d)  Prolonged Rupture of Membranes (18 or More Hours) e) Gestational Age Less Than 37 Weeks    25 0847    25 0848  NPO Diet NPO Type: Ice Chips  Diet Effective Now,   Status:  Canceled         25 0847    25 0848  Inpatient Anesthesiology Consult  Once,   Status:  Canceled        Specialty:  Anesthesiology  Provider:  (Not yet assigned)    25 0847    25 0848  Treponema pallidum AB w/Reflex RPR  Once         25 0847    25 0848  CBC & Differential  Once         25 0847    25 0848  Comprehensive Metabolic Panel  Once         25 0847    25 0848  Type & Screen  Once         25 0847    25 0848  Insert Peripheral IV  Once,   Status:  Canceled         25 0847    25 0848  Saline Lock & Maintain IV Access  Continuous,   Status:  Canceled         25 0847    25 0848  Place Sequential Compression Device  Once,   Status:  Canceled         25 0847    25 0848  Maintain Sequential Compression Device  Continuous,   Status:  Canceled         25 0847    25 0848  CBC Auto Differential  PROCEDURE ONCE         25 0847    25 0847  Position Change -  "For Intra-Uterine Resusitation for Hypertonus, HyperStimulation or Non-Reassuring Fetal Status  As Needed,   Status:  Canceled       04/08/25 0847    04/08/25 0847  sodium chloride 0.9 % flush 10 mL  As Needed,   Status:  Discontinued         04/08/25 0847 04/08/25 0847  sodium chloride 0.9 % infusion 40 mL  As Needed,   Status:  Discontinued         04/08/25 0847 04/08/25 0847  lidocaine (XYLOCAINE) 1 % injection 0.5 mL  Once As Needed,   Status:  Discontinued         04/08/25 0847    04/08/25 0847  lactated ringers bolus 1,000 mL  Once As Needed         04/08/25 0847 04/08/25 0847  acetaminophen (TYLENOL) tablet 650 mg  Every 4 Hours PRN,   Status:  Discontinued         04/08/25 0847 04/08/25 0847  ondansetron ODT (ZOFRAN-ODT) disintegrating tablet 4 mg  Every 6 Hours PRN,   Status:  Discontinued        Placed in \"Or\" Linked Group    04/08/25 0847    04/08/25 0847  ondansetron (ZOFRAN) injection 4 mg  Every 6 Hours PRN,   Status:  Discontinued        Placed in \"Or\" Linked Group    04/08/25 0847    04/08/25 0847  terbutaline (BRETHINE) injection 0.25 mg  As Needed,   Status:  Discontinued         04/08/25 0847 04/08/25 0847  mineral oil liquid 30 mL  Once As Needed,   Status:  Discontinued         04/08/25 0847 04/08/25 0847  famotidine (PEPCID) injection 20 mg  2 Times Daily PRN,   Status:  Discontinued        Placed in \"Or\" Linked Group    04/08/25 0847    04/08/25 0847  famotidine (PEPCID) tablet 20 mg  2 Times Daily PRN,   Status:  Discontinued        Placed in \"Or\" Linked Group    04/08/25 0847    Unscheduled  Apply Ice to Perineum  As Needed      Comments: For 20 min q 2 hrs    04/09/25 0413    Unscheduled  Kpad  As Needed      Comments: For pain    04/09/25 0413    Unscheduled  Apply witch hazel pads / TUCKS to perineum as needed for comfort PRN  As Needed       04/09/25 0413    Unscheduled  Warm compress  As Needed       04/09/25 0413    Unscheduled  Apply ace wrap, tight bra, or binder  " As Needed       25 0413    Unscheduled  Apply ice packs  As Needed       25 0413                     Operative/Procedure Notes (last 72 hours)        Jose Lauren MD at 25 0031           [facility]   Vaginal Delivery Note    Patient Name: Mignon Vasques  : 1995  MRN: [id number]    Date of Delivery: 2025    Diagnosis     Pre & Post-Delivery:  Intrauterine pregnancy at 38w0d  Labor status: Induced Onset of Labor    Buprenorphine maintenance treatment affecting pregnancy in third trimester    Anxious mood    Lumbar radiculopathy    Protruded lumbar disc    Sciatic nerve pain    Pregnancy    Buprenorphine maintenance treatment affecting pregnancy    Tobacco use during pregnancy, antepartum    Maternal anemia in pregnancy, antepartum    Club foot, fetal, affecting care of mother, antepartum             Problem List    Transfer to Postpartum     Review the Delivery Report for details.     Delivery     Delivery: Vaginal, Spontaneous    YOB: 2024   Time of Birth:  Gestational Age 12:18 AM  38w0d     Anesthesia: Epidural    Delivering clinician: Jose Lauren   Forceps?   No   Vacuum? No    Shoulder dystocia present: No        Delivery narrative: Patient is a 29-year-old  8 para 3-0-4-3 at 38 weeks who was admitted for induction of labor due to use in pregnancy.  Group B strep was negative.  Patient was admitted and started on Pitocin.  Amniotomy revealed clear fluid.  Fetal heart rate tracing was category 1.  Patient received an epidural overall slow progress through labor and was at 5 cm for several hours.  She had periods of category 2 tracing with decreased variability.  She later had variable decelerations.  Amnioinfusion was done.  When the patient was complete she was prepped and draped for delivery.  Patient pushed with excellent effort.  Fetal head delivered and restituted to the maternal left.  Nares and mouth were bulb suctioned.  There was no nuchal cord.   "Shoulders and body delivered without difficulty.  Baby was placed on mom's abdomen and after 30 seconds the cord was clamped and cut.  Cord gases and cord blood were collected.  Placenta was noted to be complete.  Pitocin was started per protocol.  Fundus was firm.  Perineum and vagina were inspected and no lacerations were seen.  Fundus was rechecked and noted to be firm.  Mom and baby are recovering well.      Infant     Findings: male infant     Infant observations: Weight: No birth weight on file.  Length:   in  Observations/Comments:  Ldr 5 panda     Apgars:   @ 1 minute /      @ 5 minutes   Infant Name: Jose C     Placenta & Cord         Placenta delivered    at        Cord: 3 vessels present.   Nuchal Cord?  no   Cord blood obtained: Yes   Cord gases obtained:  Yes   Cord gas results: Venous:  No results found for: \"PHCVEN\", \"BECVEN\"    Arterial:  No results found for: \"PHCART\", \"BECART\"     Repair     Episiotomy: Not recorded    No    Lacerations: No   Estimated Blood Loss:       Quantitative Blood Loss:  69 cc        Complications     none    Disposition     Mother to Mother Baby/Postpartum  in stable condition currently.  Baby to remains with mom  in stable condition currently.    Jose Lauren MD  04/09/25  00:31 EDT          Electronically signed by Jose Lauren MD at 04/09/25 0036          Physician Progress Notes (last 72 hours)        Parvin Marcum MD at 04/09/25 1006          [facility]  Vaginal Delivery Progress Note    Subjective   Postpartum Day 0: Vaginal Delivery    The patient feels well.  Her pain is adequately controlled.  She complains of mild discomfort with voiding after delivery.  Patient describes her bleeding as staining only.    ROS:  : pos for discomfort with void postpartum, neg for heavy bleeding  Musculoskeletal: pos for left calf tenderness    Objective     Vital Signs Range for the last 24 hours  Temperature: Temp:  [97.2 °F (36.2 °C)-98.5 °F (36.9 °C)] 98.3 °F (36.8 °C) " "  Temp Source: Temp src: Oral   BP: BP: ()/(43-93) 112/72   Pulse: Heart Rate:  [63-92] 92   Respirations: Resp:  [16-18] 16   SPO2: SpO2:  [94 %-100 %] 99 %   O2 Amount (l/min):     O2 Devices     Weight:       Admit Height:  Height: 160 cm (63\")      Physical Exam:  General:  no acute distress.  Abdomen: Fundus: appropriate, firm, non tender  Extremities: Bilateral lower ext with trace edema, no cords, pt notes tenderness with palpation of left calf.         Rubella:  Transcribed from prenatal record --    Rubella Antibodies, IgG   Date Value Ref Range Status   08/29/2024 2.75 Immune >0.99 index Final     Comment:                                     Non-immune       <0.90                                  Equivocal  0.90 - 0.99                                  Immune           >0.99       Rh Status:    RH type   Date Value Ref Range Status   04/08/2025 Positive  Final     Rh Factor   Date Value Ref Range Status   08/29/2024 Positive  Final     Comment:     Please note: Prior records for this patient's ABO / Rh type are not  available for additional verification.       Immunizations:   Immunization History   Administered Date(s) Administered    COVID-19 ([place]) 03/15/2022    DTP / HiB 1996, 02/20/1997    DTaP, Unspecified 04/24/1997, 04/27/1999    Flu Vaccine Split Quad 10/27/2015    Hep B, Adolescent or Pediatric 1996, 04/24/1997    HiB 04/24/1997    MMR 04/27/1999    OPV 1996, 02/20/1997, 04/27/1999    Tdap 03/21/2016, 01/21/2025    Varicella 04/27/1999       Assessment & Plan       Buprenorphine maintenance treatment affecting pregnancy in third trimester    Anxious mood    Lumbar radiculopathy    Protruded lumbar disc    Sciatic nerve pain    Pregnancy    Buprenorphine maintenance treatment affecting pregnancy    Tobacco use during pregnancy, antepartum    Maternal anemia in pregnancy, antepartum    Club foot, fetal, affecting care of mother, antepartum    Pain of left calf    " "Dysuria      Mignon Vasques is Day 0  post-partum  Vaginal, Spontaneous  : pt is stable postpartum day 0    Dysuria: pt has noted only since removal of catheter. She notes symptoms are suggestive of early UTI for her. Will check UA, culture as needed.     Calf tenderness: doppler ordered     Plan:  Continue current care.      Parvin Marcum MD  4/9/2025  10:09 EDT      Electronically signed by Parvin Marcum MD at 04/09/25 1011       Jose Lauren MD at 04/08/25 2201          Patient is comfortable with her epidural    BP 93/59   Pulse 71   Temp 97.5 °F (36.4 °C) (Oral)   Resp 16   Ht 160 cm (63\")   Wt 93.2 kg (205 lb 6.4 oz)   LMP 07/01/2024 (Approximate)   SpO2 95%   Breastfeeding Yes   BMI 36.38 kg/m²   IUPC catheter is replaced because resting tone was measuring high.  Abdomen was palpating soft between contractions  Fetal heart rate tracing with periods of decreased variability.  With scalp stimulation there is good acceleration and increased variability after  Cervix is 5 to 6 cm 70% and -1 station, bloody show is noted  Pitocin is not 6 milliunits  Contractions are every 2 to 4 minutes    Assessment-  Slow cervical change    Plan-continue Pitocin    Electronically signed by Jose Lauren MD at 04/08/25 2203       Consult Notes (last 72 hours)  Notes from 04/07/25 1044 through 04/10/25 1044   No notes of this type exist for this encounter.       "

## 2025-04-10 NOTE — PROGRESS NOTES
"Discharge Planning Assessment  [facility]     Patient Name: Mignon Vasques  MRN: [id number]  Today's Date: 4/10/2025    Admit Date: 4/8/2025    Plan: Infant may discharge to mother when medically ready; CSW will follow cord tox. ENDY Sosa.   Discharge Needs Assessment    No documentation.                  Discharge Plan       Row Name 04/10/25 1128       Plan    Plan Infant may discharge to mother when medically ready; CSW will follow cord tox. ENDY Sosa.    Plan Comments Mother: Mignon Vasques, MRN: [id number]; infant: DamiánBoyani \"Jose C\" Layo, MRN: [id number]. CSW consulted for \"history substance abuse.\" Of note, mother's UDS was negative prenatally on 8/29. Mother's UDS was positive for Buprenorphine on admit. Infant's UDS was missed; cord toxicology sent. CSW met with mother at bedside while father of infant/fiancé and paternal sister were in the room. Mother gave consent for father and paternal sister to be present during assessment. Mother verified address, phone number, and insurance. Mother reports [employer] has not spoken to her about adding infant to health insurance. Mother reports having a car seat, crib/bassinet, clothes, and diapers for infant. Mother has three other children: 12yr old, 9yr old, & 7yr old, who are being cared for by maternal grandma during hospital stay. Mother reports, maternal grandma, paternal grandma, paternal sister, father of infant/fiancé, and other family members are available for support as needed. Mother reports infant is following up with Dr. Carson after discharge; mother is comfortable scheduling appointments for infant and has reliable transportation. Mother is current with WIC benefits and has an appointment to add infant onto her WIC plan following discharge. Mother reports she is prescribed Suboxone through the [employer] program. CSW explained her role as NICU  and encouraged parents to reach out if needed. CSW provided mother with a packet of " resources including: WIC, HANDS, transportation, infant supplies, counseling, online support groups, postpartum mood and anxiety resources, NICU parent resources, and general community resources. CSW spent time building rapport with mother, and offered validation, support, and encouragement to mother throughout assessment. Mother and father were polite and appropriate, and denied having unmet needs or concerns at this time. CSW will remain available for psychosocial needs while infant is in the NICU. CSW will follow cord toxicology and complete mandated reporting to CPS if warranted. ENDY Sosa.                    Expected Discharge Date and Time       Expected Discharge Date Expected Discharge Time    Apr 10, 2025            Demographic Summary       Row Name 04/10/25 1127       General Information    Admission Type inpatient    Arrived From home    Referral Source nursing    Reason for Consult substance use concerns    General Information Comments History substance abuse                   Functional Status       Row Name 04/10/25 1127       Functional Status, IADL    Medications independent    Meal Preparation independent    Housekeeping independent    Laundry independent    Shopping independent       Mental Status    General Appearance WDL WDL       Mental Status Summary    Recent Changes in Mental Status/Cognitive Functioning no changes                   Psychosocial       Row Name 04/10/25 1128       Behavior WDL    Behavior WDL WDL       Emotion Mood WDL    Emotion/Mood/Affect WDL WDL       Speech WDL    Speech WDL WDL       Perceptual State WDL    Perceptual State WDL WDL       Thought Process WDL    Thought Process WDL WDL       Intellectual Performance WDL    Intellectual Performance WDL WDL       Coping/Stress    Major Change/Loss/Stressor birth    Patient Personal Strengths future/goal oriented;motivated;positive attitude;strong support system    Sources of Support other family  members;parent(s);sibling(s);spouse                   Abuse/Neglect       Row Name 04/10/25 1128       Personal Safety    Physical Signs of Abuse Present no                   Legal    No documentation.                  Substance Abuse       Row Name 04/10/25 1128       Substance Use    Substance Use Comment Mother's UDS was positive for Buprenorphine. No infant UDS; cord tox sent.                   Patient Forms    No documentation.                     BETO Barry

## 2025-04-10 NOTE — PROGRESS NOTES
Obstetrics and Gynecology     4/10/2025    Name:Mignon Vasques   MR#:[id number]    Vaginal Delivery Progress Note    HD#2    Subjective   Postpartum Day 1: 29 y.o. female  delivered at 38w0d  delivered a male infant.     The patient feels well.  Her pain is controlled.    She is ambulating well.  Patient describes her bleeding as moderate lochia. She has some preexisting low back pain and also some discomfort around epidural side.    Desires to restart on cymbalta for her mood, has been on this before.    Breastfeeding/bottle:  The patient is not currently breastfeeding.    Patient Active Problem List   Diagnosis    Back pain    Anxious mood    Asthma during pregnancy    History of depression    Lumbar radiculopathy    Protruded lumbar disc    Sciatic nerve pain    Shoulder instability, right    Maternal varicella, non-immune    Pregnancy    History of substance abuse    Buprenorphine maintenance treatment affecting pregnancy    Constipation during pregnancy in second trimester    Tobacco use during pregnancy, antepartum    Request for sterilization    Maternal anemia in pregnancy, antepartum    Club foot, fetal, affecting care of mother, antepartum    Buprenorphine maintenance treatment affecting pregnancy in third trimester    Pain of left calf    Dysuria       Objective   Vital Signs Range for the last 24 hours  Temp: Temp:  [97.3 °F (36.3 °C)-97.7 °F (36.5 °C)] 97.3 °F (36.3 °C) Temp src: Oral   BP: BP: (113-127)/(75-84) 127/84        Pulse: Heart Rate:  [76-81] 76  RR: Resp:  [16-18] 18  Weight: 93.2 kg (205 lb 6.4 oz)  BMI:  Body mass index is 36.38 kg/m².    Results from last 7 days   Lab Units 04/10/25  0820 25  0905   WBC 10*3/mm3 8.23 9.33   HEMOGLOBIN g/dL 10.3* 11.4*   HEMATOCRIT % 31.0* 34.1   PLATELETS 10*3/mm3 155 171     Results from last 7 days   Lab Units 25  0905   SODIUM mmol/L 138   POTASSIUM mmol/L 3.4*   CHLORIDE mmol/L 104   CO2 mmol/L 21.7*   BUN  mg/dL 7   CREATININE mg/dL 0.55*   CALCIUM mg/dL 9.0   ALK PHOS U/L 178*   ALT (SGPT) U/L 11   AST (SGOT) U/L 15   GLUCOSE mg/dL 137*       Physical Exam  Well, no distress  Regular, nonlabored breathing  Fundus firm, well below umbilicus  Calves nontender, trace edema  Epidural site without erythema or induration, very mild tenderness    Assessment/Plan   1.  PPD# 1    Buprenorphine maintenance treatment affecting pregnancy in third trimester    Anxious mood    Lumbar radiculopathy    Protruded lumbar disc    Sciatic nerve pain    Pregnancy    Buprenorphine maintenance treatment affecting pregnancy    Tobacco use during pregnancy, antepartum    Maternal anemia in pregnancy, antepartum    Club foot, fetal, affecting care of mother, antepartum    Pain of left calf    Dysuria      Plan:   Patient requests discharge  She delivered early on day 0 and is appropriate. She has some lower back pain, will order lidocaine patch and start cymbalta at her request.    Aleena Keane MD  4/10/2025 11:03 EDT

## 2025-04-11 ENCOUNTER — MATERNAL SCREENING (OUTPATIENT)
Dept: CALL CENTER | Facility: HOSPITAL | Age: 30
End: 2025-04-11
Payer: COMMERCIAL

## 2025-04-11 LAB
BUPRENORPHINE UR QL CFM: NORMAL
BUPRENORPHINE/CREAT UR: 214 NG/MG CREAT
LEVEL OF DETECTION:: NORMAL
NORBUPRENORPHINE/CREAT UR: 773 NG/MG CREAT

## 2025-04-11 NOTE — OUTREACH NOTE
Maternal Screening Survey      Flowsheet Row Responses   Eligibility Eligible   Prep survey completed? Yes   Facility patient discharged from? [facility] - Registered Nurse

## 2025-04-12 LAB
BUPRENORPHINE UR QL CFM: NORMAL
BUPRENORPHINE/CREAT UR: 208 NG/MG CREAT
LEVEL OF DETECTION:: NORMAL
NORBUPRENORPHINE/CREAT UR: 763 NG/MG CREAT

## 2025-04-16 NOTE — PROGRESS NOTES
"Continued Stay Note  [facility]     Patient Name: Mignon Vasques  MRN: [id number]  Today's Date: 4/16/2025    Admit Date: 4/8/2025    Plan: Infant may discharge to mother when medically ready. ENDY Trent   Discharge Plan       Row Name 04/16/25 1534       Plan    Plan Infant may discharge to mother when medically ready. ENDY Trent    Plan Comments Mother: Mignon Vasques, MRN: [id number]; infant: DamiánBoyani \"Jose C\" Layo, MRN: [id number]. CSW reviewed infant's cord toxicology, and results were positive for Gabapentin, and Norbuprenorphine, and this is lab confirmed. Mother is prescribed both of these medications, therefore a CPS report is not warranted at this time. CSW will remain available for psychosocial needs during infant's NICU stay. ENDY Trent                   Discharge Codes    No documentation.                 Expected Discharge Date and Time       Expected Discharge Date Expected Discharge Time    Apr 10, 2025               BETO Batres    " Continue metformin. Check labs at your upcoming appointment with Dr. Jimenez.

## 2025-04-21 ENCOUNTER — MATERNAL SCREENING (OUTPATIENT)
Dept: CALL CENTER | Facility: HOSPITAL | Age: 30
End: 2025-04-21
Payer: COMMERCIAL

## 2025-04-21 NOTE — OUTREACH NOTE
Maternal Screening Survey      Flowsheet Row Responses   Facility patient discharged from? [facility]   Attempt successful? No   Unsuccessful attempts Attempt 1  [requested call back in 35 minutes]              Joanne FISHER - Registered Nurse

## 2025-04-21 NOTE — OUTREACH NOTE
Maternal Screening Survey      Flowsheet Row Responses   Facility patient discharged from? [place]   Attempt successful? No   Unsuccessful attempts Attempt 1  [requested call back in 35 minutes]              Joanne FISHER - Registered Nurse

## 2025-04-21 NOTE — OUTREACH NOTE
Maternal Screening Survey      Flowsheet Row Responses   Facility patient discharged from? [place]   Attempt successful? No   Unsuccessful attempts Attempt 2              Joanne FISHER - Registered Nurse

## 2025-04-22 ENCOUNTER — MATERNAL SCREENING (OUTPATIENT)
Dept: CALL CENTER | Facility: HOSPITAL | Age: 30
End: 2025-04-22
Payer: COMMERCIAL

## 2025-04-22 NOTE — OUTREACH NOTE
Maternal Screening Survey      Flowsheet Row Responses   [facility]   [facility] successful? Yes   Call start time 1301   Call end time 1304   I have been able to laugh and see the funny side of things. 0   I have looked forward with enjoyment to things. 0   I have blamed myself unnecessarily when things went wrong. 3   I have been anxious or worried for no good reason. 0   I have felt scared or panicky for no good reason. 0   Things have been getting on top of me. 0   I have been so unhappy that I have had difficulty sleeping. 0   I have felt sad or miserable. 0   I have been so unhappy that I have been crying. 0   The thought of harming myself has occurred to me. 0   [place]  Depression Scale Total 3   Did any of your parents have problems with alcohol or drug use? No   Do any of your peers have problems with alcohol or drug use? No   Does your partner have problems with alcohol or drug use? No   Before you were pregnant did you have problems with alcohol or drug use? (past) No   In the past month, did you drink beer, wine, liquor or use any other drugs? (pregnancy) No   Maternal Screening call completed Yes   Call end time 1304              KIYA KAT - Registered Nurse